# Patient Record
Sex: FEMALE | Race: WHITE | NOT HISPANIC OR LATINO | Employment: FULL TIME | URBAN - METROPOLITAN AREA
[De-identification: names, ages, dates, MRNs, and addresses within clinical notes are randomized per-mention and may not be internally consistent; named-entity substitution may affect disease eponyms.]

---

## 2022-12-16 ENCOUNTER — HOSPITAL ENCOUNTER (EMERGENCY)
Facility: HOSPITAL | Age: 32
Discharge: HOME | End: 2022-12-16
Attending: STUDENT IN AN ORGANIZED HEALTH CARE EDUCATION/TRAINING PROGRAM
Payer: MEDICAID

## 2022-12-16 VITALS
SYSTOLIC BLOOD PRESSURE: 105 MMHG | TEMPERATURE: 98 F | BODY MASS INDEX: 15.36 KG/M2 | HEART RATE: 75 BPM | OXYGEN SATURATION: 98 % | RESPIRATION RATE: 18 BRPM | DIASTOLIC BLOOD PRESSURE: 71 MMHG | WEIGHT: 90 LBS | HEIGHT: 64 IN

## 2022-12-16 DIAGNOSIS — R10.11 RUQ ABDOMINAL PAIN: ICD-10-CM

## 2022-12-16 DIAGNOSIS — K21.9 GASTROESOPHAGEAL REFLUX DISEASE WITHOUT ESOPHAGITIS: Primary | ICD-10-CM

## 2022-12-16 LAB
ALBUMIN SERPL-MCNC: 4.5 G/DL (ref 3.5–5)
ALBUMIN/GLOB SERPL: 1.7 RATIO (ref 1.1–2)
ALP SERPL-CCNC: 63 UNIT/L (ref 40–150)
ALT SERPL-CCNC: 14 UNIT/L (ref 0–55)
APPEARANCE UR: CLEAR
AST SERPL-CCNC: 17 UNIT/L (ref 5–34)
B-HCG SERPL QL: NEGATIVE
BACTERIA #/AREA URNS AUTO: NORMAL /HPF
BASOPHILS # BLD AUTO: 0.02 X10(3)/MCL (ref 0–0.2)
BASOPHILS NFR BLD AUTO: 0.2 %
BILIRUB UR QL STRIP.AUTO: NEGATIVE MG/DL
BILIRUBIN DIRECT+TOT PNL SERPL-MCNC: 0.2 MG/DL (ref 0–0.5)
BILIRUBIN DIRECT+TOT PNL SERPL-MCNC: 0.6 MG/DL
BUN SERPL-MCNC: 7.2 MG/DL (ref 7–18.7)
CALCIUM SERPL-MCNC: 9.8 MG/DL (ref 8.4–10.2)
CHLORIDE SERPL-SCNC: 105 MMOL/L (ref 98–107)
CO2 SERPL-SCNC: 23 MMOL/L (ref 22–29)
COLOR UR AUTO: YELLOW
CREAT SERPL-MCNC: 0.73 MG/DL (ref 0.55–1.02)
EOSINOPHIL # BLD AUTO: 0.03 X10(3)/MCL (ref 0–0.9)
EOSINOPHIL NFR BLD AUTO: 0.3 %
ERYTHROCYTE [DISTWIDTH] IN BLOOD BY AUTOMATED COUNT: 12.9 % (ref 11–14.5)
GFR SERPLBLD CREATININE-BSD FMLA CKD-EPI: >60 MLS/MIN/1.73/M2
GLOBULIN SER-MCNC: 2.7 GM/DL (ref 2.4–3.5)
GLUCOSE SERPL-MCNC: 87 MG/DL (ref 74–100)
GLUCOSE UR QL STRIP.AUTO: NEGATIVE MG/DL
HCT VFR BLD AUTO: 43.3 % (ref 37–47)
HGB BLD-MCNC: 14.9 GM/DL (ref 12–16)
IMM GRANULOCYTES # BLD AUTO: 0.03 X10(3)/MCL (ref 0–0.04)
IMM GRANULOCYTES NFR BLD AUTO: 0.3 %
KETONES UR QL STRIP.AUTO: NEGATIVE MG/DL
LEUKOCYTE ESTERASE UR QL STRIP.AUTO: NEGATIVE UNIT/L
LIPASE SERPL-CCNC: 38 U/L
LYMPHOCYTES # BLD AUTO: 2.65 X10(3)/MCL (ref 0.6–4.6)
LYMPHOCYTES NFR BLD AUTO: 28 %
MCH RBC QN AUTO: 31.8 PG
MCHC RBC AUTO-ENTMCNC: 34.4 MG/DL (ref 33–36)
MCV RBC AUTO: 92.5 FL (ref 80–94)
MONOCYTES # BLD AUTO: 0.48 X10(3)/MCL (ref 0.1–1.3)
MONOCYTES NFR BLD AUTO: 5.1 %
NEUTROPHILS # BLD AUTO: 6.25 X10(3)/MCL (ref 2.1–9.2)
NEUTROPHILS NFR BLD AUTO: 66.1 %
NITRITE UR QL STRIP.AUTO: NEGATIVE
NRBC BLD AUTO-RTO: 0 % (ref 0–1)
PH UR STRIP.AUTO: 7 [PH]
PLATELET # BLD AUTO: 183 X10(3)/MCL (ref 140–371)
PMV BLD AUTO: 10.4 FL (ref 9.4–12.4)
POTASSIUM SERPL-SCNC: 4.1 MMOL/L (ref 3.5–5.1)
PROT SERPL-MCNC: 7.2 GM/DL (ref 6.4–8.3)
PROT UR QL STRIP.AUTO: NEGATIVE MG/DL
RBC # BLD AUTO: 4.68 X10(6)/MCL (ref 4.2–5.4)
RBC #/AREA URNS AUTO: <5 /HPF
RBC UR QL AUTO: NEGATIVE UNIT/L
SODIUM SERPL-SCNC: 136 MMOL/L (ref 136–145)
SP GR UR STRIP.AUTO: 1.01 (ref 1–1.03)
SQUAMOUS #/AREA URNS AUTO: <5 /HPF
UROBILINOGEN UR STRIP-ACNC: 0.2 MG/DL
WBC # SPEC AUTO: 9.5 X10(3)/MCL (ref 4.5–11.5)
WBC #/AREA URNS AUTO: <5 /HPF

## 2022-12-16 PROCEDURE — 82248 BILIRUBIN DIRECT: CPT | Performed by: PHYSICIAN ASSISTANT

## 2022-12-16 PROCEDURE — 83690 ASSAY OF LIPASE: CPT | Performed by: PHYSICIAN ASSISTANT

## 2022-12-16 PROCEDURE — 81001 URINALYSIS AUTO W/SCOPE: CPT | Performed by: PHYSICIAN ASSISTANT

## 2022-12-16 PROCEDURE — 25000003 PHARM REV CODE 250

## 2022-12-16 PROCEDURE — 99285 EMERGENCY DEPT VISIT HI MDM: CPT | Mod: 25

## 2022-12-16 PROCEDURE — 63600175 PHARM REV CODE 636 W HCPCS: Performed by: PHYSICIAN ASSISTANT

## 2022-12-16 PROCEDURE — 81025 URINE PREGNANCY TEST: CPT | Performed by: PHYSICIAN ASSISTANT

## 2022-12-16 PROCEDURE — 96374 THER/PROPH/DIAG INJ IV PUSH: CPT

## 2022-12-16 PROCEDURE — 81003 URINALYSIS AUTO W/O SCOPE: CPT | Performed by: PHYSICIAN ASSISTANT

## 2022-12-16 PROCEDURE — 85025 COMPLETE CBC W/AUTO DIFF WBC: CPT | Performed by: PHYSICIAN ASSISTANT

## 2022-12-16 PROCEDURE — 80053 COMPREHEN METABOLIC PANEL: CPT | Performed by: PHYSICIAN ASSISTANT

## 2022-12-16 RX ORDER — FAMOTIDINE 20 MG/1
20 TABLET, FILM COATED ORAL DAILY
Qty: 30 TABLET | Refills: 0 | Status: ON HOLD | OUTPATIENT
Start: 2022-12-16 | End: 2024-03-19 | Stop reason: HOSPADM

## 2022-12-16 RX ORDER — LIDOCAINE HYDROCHLORIDE 20 MG/ML
15 SOLUTION OROPHARYNGEAL ONCE
Status: COMPLETED | OUTPATIENT
Start: 2022-12-16 | End: 2022-12-16

## 2022-12-16 RX ORDER — MAG HYDROX/ALUMINUM HYD/SIMETH 200-200-20
30 SUSPENSION, ORAL (FINAL DOSE FORM) ORAL ONCE
Status: COMPLETED | OUTPATIENT
Start: 2022-12-16 | End: 2022-12-16

## 2022-12-16 RX ORDER — ONDANSETRON 2 MG/ML
4 INJECTION INTRAMUSCULAR; INTRAVENOUS
Status: COMPLETED | OUTPATIENT
Start: 2022-12-16 | End: 2022-12-16

## 2022-12-16 RX ADMIN — SODIUM CHLORIDE, POTASSIUM CHLORIDE, SODIUM LACTATE AND CALCIUM CHLORIDE 1000 ML: 600; 310; 30; 20 INJECTION, SOLUTION INTRAVENOUS at 04:12

## 2022-12-16 RX ADMIN — LIDOCAINE HYDROCHLORIDE 15 ML: 20 SOLUTION ORAL at 04:12

## 2022-12-16 RX ADMIN — ONDANSETRON 4 MG: 2 INJECTION INTRAMUSCULAR; INTRAVENOUS at 04:12

## 2022-12-16 RX ADMIN — ALUMINUM HYDROXIDE, MAGNESIUM HYDROXIDE, AND SIMETHICONE 30 ML: 200; 200; 20 SUSPENSION ORAL at 04:12

## 2022-12-16 NOTE — ED PROVIDER NOTES
Encounter Date: 12/16/2022       History     Chief Complaint   Patient presents with    Abdominal Pain     Upper abd pain since last Friday, nausea and vomiting      Patient is a 32 year old female who presents to ER with RUQ abdomina pain with intermittent nausea and vomiting x 6 months. Patient reports that the pain worsened over the last week. The pain is worse after she eats and drinks caffeine. She reports that the pain improved after she changed her diet but returned after she ate last week.She denies fever, chills, chest pain, or SOB.     The history is provided by the patient. No  was used.   Abdominal Pain  The current episode started several weeks ago. The onset of the illness was gradual. The problem has not changed since onset.The abdominal pain is located in the RUQ and epigastric region. The abdominal pain does not radiate. The severity of the abdominal pain is 3/10. The abdominal pain is relieved by nothing. The other symptoms of the illness include nausea and vomiting. The other symptoms of the illness do not include fever, shortness of breath, dysuria or hematemesis.   Nausea began more than 1 week ago. The nausea is associated with eating and stress.   The vomiting began more than 2 days ago. Vomiting occurred once.   The patient states that she believes she is currently not pregnant. Symptoms associated with the illness do not include anorexia, heartburn, constipation, urgency, frequency or back pain.   Review of patient's allergies indicates:   Allergen Reactions    Codeine Anaphylaxis    Benadryl [diphenhydramine hcl] Itching     History reviewed. No pertinent past medical history.  No past surgical history on file.  No family history on file.  Social History     Tobacco Use    Smoking status: Every Day     Types: Cigarettes    Smokeless tobacco: Never     Review of Systems   Constitutional:  Negative for fever.   HENT:  Negative for congestion and sore throat.     Respiratory:  Negative for chest tightness and shortness of breath.    Cardiovascular:  Negative for chest pain.   Gastrointestinal:  Positive for abdominal pain, nausea and vomiting. Negative for abdominal distention, anorexia, constipation, heartburn and hematemesis.   Genitourinary:  Negative for dysuria, frequency and urgency.   Musculoskeletal:  Negative for back pain.   Skin:  Negative for rash.   Neurological:  Negative for weakness.   Hematological:  Does not bruise/bleed easily.   Psychiatric/Behavioral:  Negative for behavioral problems.    All other systems reviewed and are negative.    Physical Exam     Initial Vitals [12/16/22 1309]   BP Pulse Resp Temp SpO2   108/67 75 20 98.2 °F (36.8 °C) 99 %      MAP       --         Physical Exam    Nursing note and vitals reviewed.  Constitutional: Vital signs are normal. She appears well-developed and well-nourished.   HENT:   Head: Normocephalic.   Right Ear: Hearing and tympanic membrane normal.   Left Ear: Hearing and tympanic membrane normal.   Nose: Nose normal.   Mouth/Throat: Uvula is midline, oropharynx is clear and moist and mucous membranes are normal.   Cardiovascular:  Regular rhythm, normal heart sounds and normal pulses.           Pulmonary/Chest: Effort normal and breath sounds normal.   Abdominal: Abdomen is soft. Bowel sounds are normal. There is abdominal tenderness in the right upper quadrant and epigastric area.     Lymphadenopathy:     She has no cervical adenopathy.   Neurological: She is alert. GCS eye subscore is 4. GCS verbal subscore is 5. GCS motor subscore is 6.   Skin: Skin is warm, dry and intact. Capillary refill takes less than 2 seconds.       ED Course   Procedures  Labs Reviewed   LIPASE - Normal   URINALYSIS, REFLEX TO URINE CULTURE - Normal   PREGNANCY TEST, URINE RAPID - Normal   BILIRUBIN, DIRECT - Normal   URINALYSIS, MICROSCOPIC - Normal   CBC W/ AUTO DIFFERENTIAL    Narrative:     The following orders were created for  panel order CBC auto differential.  Procedure                               Abnormality         Status                     ---------                               -----------         ------                     CBC with Differential[[id number]]                            Final result                 Please view results for these tests on the individual orders.   COMPREHENSIVE METABOLIC PANEL   CBC WITH DIFFERENTIAL          Imaging Results              US Abdomen Limited (Final result)  Result time 12/16/22 13:47:18      Final result by Timothy Prakash MD (12/16/22 13:47:18)                   Impression:      1. No acute abnormalities are sonographically demonstrated.      Electronically signed by: Timothy Prakash MD  Date:    12/16/2022  Time:    13:47               Narrative:    EXAMINATION:  US ABDOMEN LIMITED    CLINICAL HISTORY:  Right upper quadrant pain.    COMPARISON:  None.    FINDINGS:  Multiple transverse and sagittal grayscale sonographic images were acquired through the right upper quadrant. The liver measures approximately 16.3 cm in craniocaudal dimension.  The main portal vein demonstrates antegrade flow. The common bile duct measures approximately 1.9 mm in visualized diameter. No evidence of cholelithiasis, gallbladder wall thickening, or pericholecystic fluid is demonstrated. No sonographic Le sign was present per the ultrasound technologist report which should be correlated for any potential analgesic administration. The pancreas is obscured by bowel gas artifact. The limitedly visualized and assessed portions of the pancreas appear to be grossly within normal limits. The right kidney measures approximately 9.6 cm in length. No evidence of hydronephrosis or definite echogenic and shadowing renal calculi is demonstrated. The proximal visualized IVC appears to be patent.  The visualized abdominal aorta appears to be grossly normal in caliber.                                       Medications    lactated ringers bolus 1,000 mL (0 mLs Intravenous Stopped 12/16/22 7545)   ondansetron injection 4 mg (4 mg Intravenous Given 12/16/22 1624)   aluminum-magnesium hydroxide-simethicone 200-200-20 mg/5 mL suspension 30 mL (30 mLs Oral Given 12/16/22 1649)     And   LIDOcaine HCl 2% oral solution 15 mL (15 mLs Oral Given 12/16/22 1649)     Medical Decision Making:   Initial Assessment:   Awake and alert, NAD.  Differential Diagnosis:   Gastroenteritis, GERD, gastritis, cholelithiasis, cholecystitis  Clinical Tests:   Lab Tests: Reviewed and Ordered  Radiological Study: Ordered and Reviewed  ED Management:  Patient is a 32 year old female who presents to ER with epigastric and RUQ pain after eating x 6 months. She appears very comfortable. On exam, she does have some RUQ tenderness. She reports her symptoms initially improved after she changed her diet. Her symptoms seem consistent with GERD. Her pain improved after zofran and Gi cocktail. She is able to tolerate Po without issue. Her labs are unremarkable and US abdomen shows no acute abnormality. She is ready to be discharged. Will dc on pepcid and carafate. Encouraged patient to follow up with PCP. Given strict er return precautions.                         Clinical Impression:   Final diagnoses:  [K21.9] Gastroesophageal reflux disease without esophagitis (Primary)  [R10.11] RUQ abdominal pain        ED Disposition Condition    Discharge Stable          ED Prescriptions       Medication Sig Dispense Start Date End Date Auth. Provider    famotidine (PEPCID) 20 MG tablet Take 1 tablet (20 mg total) by mouth once daily. 30 tablet 12/16/2022 1/15/2023 Merline Travis NP          Follow-up Information       Follow up With Specialties Details Why Contact Info    Primary Care Provider  Schedule an appointment as soon as possible for a visit  As needed, If symptoms worsen              Merline Travis NP  12/16/22 8130

## 2022-12-16 NOTE — FIRST PROVIDER EVALUATION
"Medical screening examination initiated.  I have conducted a focused provider triage encounter, findings are as follows:    Brief history of present illness:  33 yo female presents to ED for evaluation of RUQ abdominal, nausea and vomiting for the past week. Complains of chills.     Vitals:    12/16/22 1309   BP: 108/67   Patient Position: Sitting   Pulse: 75   Resp: 20   Temp: 98.2 °F (36.8 °C)   TempSrc: Oral   SpO2: 99%   Weight: 40.8 kg (90 lb)   Height: 5' 4" (1.626 m)       Pertinent physical exam:  Patient is awake and alert and oriented.  Ambulatory to triage.  In no acute distress.      Brief workup plan:  Labs, UA, UPT, US    Preliminary workup initiated; this workup will be continued and followed by the physician or advanced practice provider that is assigned to the patient when roomed.  "

## 2024-03-12 ENCOUNTER — HOSPITAL ENCOUNTER (EMERGENCY)
Facility: HOSPITAL | Age: 34
Discharge: HOME | End: 2024-03-12
Attending: FAMILY MEDICINE
Payer: MEDICAID

## 2024-03-12 VITALS
DIASTOLIC BLOOD PRESSURE: 74 MMHG | WEIGHT: 99.19 LBS | SYSTOLIC BLOOD PRESSURE: 112 MMHG | HEIGHT: 64 IN | BODY MASS INDEX: 16.93 KG/M2 | TEMPERATURE: 98 F | OXYGEN SATURATION: 96 % | RESPIRATION RATE: 18 BRPM | HEART RATE: 124 BPM

## 2024-03-12 DIAGNOSIS — Z13.9 ENCOUNTER FOR MEDICAL SCREENING EXAMINATION: Primary | ICD-10-CM

## 2024-03-12 PROCEDURE — 99281 EMR DPT VST MAYX REQ PHY/QHP: CPT

## 2024-03-12 NOTE — Clinical Note
"Felicita Geronimoelle" Esme was seen and treated in our emergency department on 3/12/2024.  She may return to work on 03/13/2024.       If you have any questions or concerns, please don't hesitate to call.      Dr. Fiore/Seema Danielson RN    "

## 2024-03-12 NOTE — ED NOTES
"Pt denies SI or HI, denies need for PEC, denies fighting with mother at home, exhibits flat affect and slow timing with responses, Dr. Fiore confirmed PEC is not needed since no SI or HI, pt reports "I have someone to come pick me up," pt waiting comfortably for ride.   "

## 2024-03-12 NOTE — ED PROVIDER NOTES
Encounter Date: 3/12/2024       History     Chief Complaint   Patient presents with    Psychiatric Evaluation     OPC'd by mother for Bipolar, anxiety, noncompliance with medications. Denies depression or suicidal/homicidal ideations. Reports she takes her medications sometimes     33-year-old presents on an OPC says she has a history of bipolar disorder and uses marijuana mom says she has not been cleaning herself she is depressed and she is concerned she suicidal patient denies any homicidal or suicidal ideations patient says she is depressed but not to the point of wanting to hurt herself or hurting anybody else patient states she does have a psychiatrist that she sees but not consistently and has not been consistent with her medications after having long discussion with the patient at this time I do not patient feel patient is a danger to herself she has no suicidal ideations no homicidal ideations no desire to hurt herself says that she will get an appointment with her psychiatrist states she does cleaned herself and she does eat she just has a disagreement with her mom nurses present in the room during this interview patient is calm and quiet answers questions appropriately shows no signs of antwon or psychotic episodes I do not feel it this point would be appropriate to come in her against her will patient knows if she desires help she can come back and we will be more than happy to get her placed in his psychiatric hospital she said she would just like to get back on her meds how that does        Review of patient's allergies indicates:   Allergen Reactions    Codeine Anaphylaxis and Rash    Hydrocodone-acetaminophen Other (See Comments) and Swelling     Facial and mouth swelling    Diphenhydramine-pseudoephed     Diphenhydramine hcl Itching and Rash     Past Medical History:   Diagnosis Date    Anxiety disorder, unspecified     Bipolar disorder, unspecified      History reviewed. No pertinent surgical  history.  History reviewed. No pertinent family history.  Social History     Tobacco Use    Smoking status: Every Day     Types: Cigarettes    Smokeless tobacco: Never   Substance Use Topics    Drug use: Not Currently     Review of Systems   Constitutional:  Negative for fever.   HENT:  Negative for sore throat.    Respiratory:  Negative for shortness of breath.    Cardiovascular:  Negative for chest pain.   Gastrointestinal:  Negative for nausea.   Genitourinary:  Negative for dysuria.   Musculoskeletal:  Negative for back pain.   Skin:  Negative for rash.   Neurological:  Negative for weakness.   Hematological:  Does not bruise/bleed easily.   Psychiatric/Behavioral:  Negative for agitation, behavioral problems, confusion, hallucinations, self-injury, sleep disturbance and suicidal ideas. The patient is not nervous/anxious.    All other systems reviewed and are negative.      Physical Exam     Initial Vitals [03/12/24 1131]   BP Pulse Resp Temp SpO2   112/74 (!) 124 18 98 °F (36.7 °C) 96 %      MAP       --         Physical Exam    Nursing note and vitals reviewed.  Constitutional: She appears well-developed and well-nourished. She is active.   HENT:   Head: Normocephalic and atraumatic.   Eyes: Conjunctivae, EOM and lids are normal. Pupils are equal, round, and reactive to light.   Neck: Trachea normal and phonation normal. Neck supple. No thyroid mass present.   Normal range of motion.  Cardiovascular:  Normal rate, regular rhythm, normal heart sounds and normal pulses.           Pulmonary/Chest: Breath sounds normal.   Abdominal: Abdomen is soft. Bowel sounds are normal.   Musculoskeletal:         General: Normal range of motion.      Cervical back: Normal range of motion and neck supple.     Neurological: She is alert and oriented to person, place, and time. She has normal strength and normal reflexes.   Skin: Skin is warm and intact.   Psychiatric: She has a normal mood and affect. Her speech is normal and  behavior is normal. Judgment and thought content normal. Cognition and memory are normal.         ED Course   Procedures  Labs Reviewed - No data to display       Imaging Results    None          Medications - No data to display  Medical Decision Making  33-year-old presents on an OPC says she has a history of bipolar disorder and uses marijuana mom says she has not been cleaning herself she is depressed and she is concerned she suicidal patient denies any homicidal or suicidal ideations patient says she is depressed but not to the point of wanting to hurt herself or hurting anybody else patient states she does have a psychiatrist that she sees but not consistently and has not been consistent with her medications after having long discussion with the patient at this time I do not patient feel patient is a danger to herself she has no suicidal ideations no homicidal ideations no desire to hurt herself says that she will get an appointment with her psychiatrist states she does cleaned herself and she does eat she just has a disagreement with her mom nurses present in the room during this interview patient is calm and quiet answers questions appropriately shows no signs of antwon or psychotic episodes I do not feel it this point would be appropriate to come in her against her will patient knows if she desires help she can come back and we will be more than happy to get her placed in his psychiatric hospital she said she would just like to get back on her meds how that does          Risk  Risk Details: Differential diagnosis situational depression, dysthymia, bipolar disorder                                      Clinical Impression:  Final diagnoses:  [Z13.9] Encounter for medical screening examination (Primary)          ED Disposition Condition    Discharge Stable          ED Prescriptions    None       Follow-up Information       Follow up With Specialties Details Why Contact Info    [facility] - Emergency Dept  Emergency Medicine  As needed [address] [place] [id number]  [phone]             Mendel Fiore MD  03/12/24 1148       Mendel Fiore MD  03/12/24 1148

## 2024-03-12 NOTE — DISCHARGE INSTRUCTIONS
Please return to emergency room if you have any thoughts of harming herself or harming others order desire inpatient treatment for depression recommend he follow up with the psychiatrist and your family doctor return to ER as needed

## 2024-03-13 ENCOUNTER — HOSPITAL ENCOUNTER (EMERGENCY)
Facility: HOSPITAL | Age: 34
Discharge: PSYCHIATRIC OR SUBSTANCE USE FACILITY | End: 2024-03-13
Attending: FAMILY MEDICINE
Payer: MEDICAID

## 2024-03-13 ENCOUNTER — HOSPITAL ENCOUNTER (INPATIENT)
Facility: HOSPITAL | Age: 34
LOS: 7 days | Discharge: HOME | DRG: 885 | End: 2024-03-20
Attending: PSYCHIATRY & NEUROLOGY | Admitting: PSYCHIATRY & NEUROLOGY
Payer: MEDICAID

## 2024-03-13 VITALS
HEIGHT: 64 IN | OXYGEN SATURATION: 98 % | DIASTOLIC BLOOD PRESSURE: 85 MMHG | HEART RATE: 75 BPM | SYSTOLIC BLOOD PRESSURE: 119 MMHG | RESPIRATION RATE: 18 BRPM | BODY MASS INDEX: 18.78 KG/M2 | TEMPERATURE: 98 F | WEIGHT: 110 LBS

## 2024-03-13 DIAGNOSIS — F29 PSYCHOSIS: ICD-10-CM

## 2024-03-13 DIAGNOSIS — Z00.8 MEDICAL CLEARANCE FOR PSYCHIATRIC ADMISSION: Primary | ICD-10-CM

## 2024-03-13 LAB
ALBUMIN SERPL-MCNC: 4.7 G/DL (ref 3.5–5)
ALBUMIN/GLOB SERPL: 1.4 RATIO (ref 1.1–2)
ALP SERPL-CCNC: 65 UNIT/L (ref 40–150)
ALT SERPL-CCNC: 20 UNIT/L (ref 0–55)
AMPHET UR QL SCN: NEGATIVE
APAP SERPL-MCNC: <17.4 UG/ML (ref 17.4–30)
APPEARANCE UR: ABNORMAL
AST SERPL-CCNC: 26 UNIT/L (ref 5–34)
B-HCG SERPL QL: NEGATIVE
BACTERIA #/AREA URNS AUTO: ABNORMAL /HPF
BARBITURATE SCN PRESENT UR: NEGATIVE
BASOPHILS # BLD AUTO: 0.03 X10(3)/MCL
BASOPHILS NFR BLD AUTO: 0.3 %
BENZODIAZ UR QL SCN: NEGATIVE
BILIRUB SERPL-MCNC: 1 MG/DL
BILIRUB UR QL STRIP.AUTO: ABNORMAL
BUN SERPL-MCNC: 12.9 MG/DL (ref 7–18.7)
CALCIUM SERPL-MCNC: 10.1 MG/DL (ref 8.4–10.2)
CANNABINOIDS UR QL SCN: POSITIVE
CHLORIDE SERPL-SCNC: 102 MMOL/L (ref 98–107)
CO2 SERPL-SCNC: 20 MMOL/L (ref 22–29)
COCAINE UR QL SCN: NEGATIVE
COLOR UR AUTO: YELLOW
CREAT SERPL-MCNC: 0.76 MG/DL (ref 0.55–1.02)
EOSINOPHIL # BLD AUTO: 0.01 X10(3)/MCL (ref 0–0.9)
EOSINOPHIL NFR BLD AUTO: 0.1 %
ERYTHROCYTE [DISTWIDTH] IN BLOOD BY AUTOMATED COUNT: 12.4 % (ref 11.5–17)
ETHANOL SERPL-MCNC: <10 MG/DL
FLUAV AG UPPER RESP QL IA.RAPID: NOT DETECTED
FLUBV AG UPPER RESP QL IA.RAPID: NOT DETECTED
GFR SERPLBLD CREATININE-BSD FMLA CKD-EPI: >60 MLS/MIN/1.73/M2
GLOBULIN SER-MCNC: 3.3 GM/DL (ref 2.4–3.5)
GLUCOSE SERPL-MCNC: 91 MG/DL (ref 74–100)
GLUCOSE UR QL STRIP.AUTO: NEGATIVE
HCT VFR BLD AUTO: 42.1 % (ref 37–47)
HGB BLD-MCNC: 14.7 G/DL (ref 12–16)
IMM GRANULOCYTES # BLD AUTO: 0 X10(3)/MCL (ref 0–0.04)
IMM GRANULOCYTES NFR BLD AUTO: 0 %
KETONES UR QL STRIP.AUTO: 80
LEUKOCYTE ESTERASE UR QL STRIP.AUTO: NEGATIVE
LYMPHOCYTES # BLD AUTO: 2.19 X10(3)/MCL (ref 0.6–4.6)
LYMPHOCYTES NFR BLD AUTO: 22.8 %
MCH RBC QN AUTO: 31.7 PG (ref 27–31)
MCHC RBC AUTO-ENTMCNC: 34.9 G/DL (ref 33–36)
MCV RBC AUTO: 90.9 FL (ref 80–94)
MONOCYTES # BLD AUTO: 0.52 X10(3)/MCL (ref 0.1–1.3)
MONOCYTES NFR BLD AUTO: 5.4 %
NEUTROPHILS # BLD AUTO: 6.84 X10(3)/MCL (ref 2.1–9.2)
NEUTROPHILS NFR BLD AUTO: 71.4 %
NITRITE UR QL STRIP.AUTO: NEGATIVE
OPIATES UR QL SCN: NEGATIVE
PCP UR QL: NEGATIVE
PH UR STRIP.AUTO: 5.5 [PH]
PH UR: 5.5 [PH] (ref 3–11)
PLATELET # BLD AUTO: 193 X10(3)/MCL (ref 130–400)
PMV BLD AUTO: 10.7 FL (ref 7.4–10.4)
POTASSIUM SERPL-SCNC: 3.8 MMOL/L (ref 3.5–5.1)
PROT SERPL-MCNC: 8 GM/DL (ref 6.4–8.3)
PROT UR QL STRIP.AUTO: 100
RBC # BLD AUTO: 4.63 X10(6)/MCL (ref 4.2–5.4)
RBC #/AREA URNS AUTO: ABNORMAL /HPF
RBC UR QL AUTO: ABNORMAL
SALICYLATES SERPL-MCNC: <5 MG/DL (ref 15–30)
SARS-COV-2 RNA RESP QL NAA+PROBE: NOT DETECTED
SODIUM SERPL-SCNC: 139 MMOL/L (ref 136–145)
SP GR UR STRIP.AUTO: >=1.03 (ref 1–1.03)
SPECIFIC GRAVITY, URINE AUTO (.000) (OHS): >=1.03 (ref 1–1.03)
SQUAMOUS #/AREA URNS AUTO: ABNORMAL /HPF
UROBILINOGEN UR STRIP-ACNC: 0.2
WBC # SPEC AUTO: 9.59 X10(3)/MCL (ref 4.5–11.5)
WBC #/AREA URNS AUTO: ABNORMAL /HPF

## 2024-03-13 PROCEDURE — 96372 THER/PROPH/DIAG INJ SC/IM: CPT | Performed by: FAMILY MEDICINE

## 2024-03-13 PROCEDURE — 11400000 HC PSYCH PRIVATE ROOM

## 2024-03-13 PROCEDURE — 80143 DRUG ASSAY ACETAMINOPHEN: CPT | Performed by: FAMILY MEDICINE

## 2024-03-13 PROCEDURE — 80307 DRUG TEST PRSMV CHEM ANLYZR: CPT | Performed by: FAMILY MEDICINE

## 2024-03-13 PROCEDURE — 81003 URINALYSIS AUTO W/O SCOPE: CPT | Performed by: FAMILY MEDICINE

## 2024-03-13 PROCEDURE — 0240U COVID/FLU A&B PCR: CPT | Performed by: FAMILY MEDICINE

## 2024-03-13 PROCEDURE — 81025 URINE PREGNANCY TEST: CPT | Performed by: FAMILY MEDICINE

## 2024-03-13 PROCEDURE — 85025 COMPLETE CBC W/AUTO DIFF WBC: CPT | Performed by: FAMILY MEDICINE

## 2024-03-13 PROCEDURE — 80179 DRUG ASSAY SALICYLATE: CPT | Performed by: FAMILY MEDICINE

## 2024-03-13 PROCEDURE — 99285 EMERGENCY DEPT VISIT HI MDM: CPT | Mod: 25

## 2024-03-13 PROCEDURE — 82077 ASSAY SPEC XCP UR&BREATH IA: CPT | Performed by: FAMILY MEDICINE

## 2024-03-13 PROCEDURE — 80053 COMPREHEN METABOLIC PANEL: CPT | Performed by: FAMILY MEDICINE

## 2024-03-13 PROCEDURE — 63600175 PHARM REV CODE 636 W HCPCS: Performed by: FAMILY MEDICINE

## 2024-03-13 RX ORDER — IBUPROFEN 400 MG/1
400 TABLET ORAL EVERY 6 HOURS PRN
Status: DISCONTINUED | OUTPATIENT
Start: 2024-03-13 | End: 2024-03-20 | Stop reason: HOSPADM

## 2024-03-13 RX ORDER — HYDROXYZINE PAMOATE 25 MG/1
50 CAPSULE ORAL EVERY 6 HOURS PRN
Status: DISCONTINUED | OUTPATIENT
Start: 2024-03-13 | End: 2024-03-20 | Stop reason: HOSPADM

## 2024-03-13 RX ORDER — ALUMINUM HYDROXIDE, MAGNESIUM HYDROXIDE, AND SIMETHICONE 1200; 120; 1200 MG/30ML; MG/30ML; MG/30ML
30 SUSPENSION ORAL EVERY 6 HOURS PRN
Status: DISCONTINUED | OUTPATIENT
Start: 2024-03-13 | End: 2024-03-20 | Stop reason: HOSPADM

## 2024-03-13 RX ORDER — IBUPROFEN 200 MG
1 TABLET ORAL DAILY
Status: DISCONTINUED | OUTPATIENT
Start: 2024-03-14 | End: 2024-03-20 | Stop reason: HOSPADM

## 2024-03-13 RX ORDER — LORAZEPAM 1 MG/1
1 TABLET ORAL 3 TIMES DAILY
Status: DISCONTINUED | OUTPATIENT
Start: 2024-03-13 | End: 2024-03-16

## 2024-03-13 RX ORDER — LORAZEPAM 2 MG/ML
2 INJECTION INTRAMUSCULAR
Status: COMPLETED | OUTPATIENT
Start: 2024-03-13 | End: 2024-03-13

## 2024-03-13 RX ORDER — BUSPIRONE HYDROCHLORIDE 15 MG/1
15 TABLET ORAL 2 TIMES DAILY
Status: ON HOLD | COMMUNITY
End: 2024-03-19 | Stop reason: HOSPADM

## 2024-03-13 RX ORDER — OLANZAPINE 5 MG/1
5 TABLET, ORALLY DISINTEGRATING ORAL NIGHTLY
Status: DISCONTINUED | OUTPATIENT
Start: 2024-03-14 | End: 2024-03-16

## 2024-03-13 RX ORDER — ZIPRASIDONE MESYLATE 20 MG/ML
10 INJECTION, POWDER, LYOPHILIZED, FOR SOLUTION INTRAMUSCULAR
Status: COMPLETED | OUTPATIENT
Start: 2024-03-13 | End: 2024-03-13

## 2024-03-13 RX ORDER — OLANZAPINE 10 MG/1
10 TABLET, ORALLY DISINTEGRATING ORAL EVERY 8 HOURS PRN
Status: DISCONTINUED | OUTPATIENT
Start: 2024-03-13 | End: 2024-03-20 | Stop reason: HOSPADM

## 2024-03-13 RX ADMIN — LORAZEPAM 2 MG: 2 INJECTION INTRAMUSCULAR; INTRAVENOUS at 01:03

## 2024-03-13 RX ADMIN — ZIPRASIDONE MESYLATE 10 MG: 20 INJECTION, POWDER, LYOPHILIZED, FOR SOLUTION INTRAMUSCULAR at 01:03

## 2024-03-13 NOTE — ED NOTES
"RENETTA called; Michaela stated pt can not be accepted for transport due to medicaid not covering transport, JUAN called, [employer] will send ETA "soon."  "

## 2024-03-13 NOTE — ED PROVIDER NOTES
Encounter Date: 3/13/2024       History     Chief Complaint   Patient presents with    Psychiatric Evaluation     Was seen on OPC yesterday and discharged.  She is brought in by PD after being found laying in the middle of the sidewalk multiple times. One time her legs were underneath the vehicle.  She appears to be hallucinating.      Acute psychosis   33-year-old female patient brought in by the police department for hallucinations and delusions patient has history of bipolar with manic episodes otherwise patient has extensive psychiatric illness but does not take any medicines for this patient has a history source with the police department        Review of patient's allergies indicates:   Allergen Reactions    Codeine Anaphylaxis and Rash    Hydrocodone-acetaminophen Other (See Comments) and Swelling     Facial and mouth swelling    Diphenhydramine-pseudoephed     Diphenhydramine hcl Itching and Rash     Past Medical History:   Diagnosis Date    Anxiety disorder, unspecified     Bipolar disorder, unspecified      No past surgical history on file.  No family history on file.  Social History     Tobacco Use    Smoking status: Every Day     Types: Cigarettes    Smokeless tobacco: Never   Substance Use Topics    Drug use: Not Currently     Review of Systems   Constitutional:  Negative for fever.   HENT:  Negative for sore throat.    Respiratory:  Negative for shortness of breath.    Cardiovascular:  Negative for chest pain.   Gastrointestinal:  Negative for nausea.   Genitourinary:  Negative for dysuria.   Musculoskeletal:  Negative for back pain.   Skin:  Negative for rash.   Neurological:  Negative for weakness.   Hematological:  Does not bruise/bleed easily.   Psychiatric/Behavioral:  Positive for agitation, behavioral problems, dysphoric mood and hallucinations.    All other systems reviewed and are negative.      Physical Exam     Initial Vitals [03/13/24 1301]   BP Pulse Resp Temp SpO2   120/64 97 18 98.3 °F  (36.8 °C) 97 %      MAP       --         Physical Exam    Nursing note and vitals reviewed.  Constitutional: She appears well-developed.   HENT:   Head: Normocephalic and atraumatic.   Right Ear: External ear normal.   Left Ear: External ear normal.   Nose: Nose normal.   Mouth/Throat: Oropharynx is clear and moist. No oropharyngeal exudate.   Eyes: Conjunctivae and EOM are normal. Pupils are equal, round, and reactive to light. Right eye exhibits no discharge. Left eye exhibits no discharge.   Neck: Neck supple. No tracheal deviation present. No JVD present.   Normal range of motion.  Cardiovascular:  Normal rate, regular rhythm, normal heart sounds and intact distal pulses.     Exam reveals no gallop and no friction rub.       No murmur heard.  Pulmonary/Chest: Breath sounds normal. No stridor. No respiratory distress. She has no wheezes. She has no rhonchi. She has no rales.   Abdominal: Abdomen is soft. Bowel sounds are normal. She exhibits no distension and no mass. There is no abdominal tenderness. There is no rebound and no guarding.   Musculoskeletal:         General: Normal range of motion.      Cervical back: Normal range of motion and neck supple.     Neurological: She is alert and oriented to person, place, and time. She has normal strength. No cranial nerve deficit.   Skin: Skin is warm and dry. No rash and no abscess noted. No erythema.   Psychiatric:   Depressed with hallucinations delusions and mild hallucinations         ED Course   Procedures  Labs Reviewed   COMPREHENSIVE METABOLIC PANEL - Abnormal; Notable for the following components:       Result Value    Carbon Dioxide 20 (*)     All other components within normal limits   URINALYSIS, REFLEX TO URINE CULTURE - Abnormal; Notable for the following components:    Appearance, UA Slightly Cloudy (*)     Protein,  (*)     Ketones, UA 80 (*)     Blood, UA Large (*)     Bilirubin, UA Small (*)     All other components within normal limits   DRUG  SCREEN, URINE (BEAKER) - Abnormal; Notable for the following components:    Cannabinoids, Urine Positive (*)     All other components within normal limits    Narrative:     Cut off concentrations:    Amphetamines - 1000 ng/ml  Barbiturates - 200 ng/ml  Benzodiazepine - 200 ng/ml  Cannabinoids (THC) - 50 ng/ml  Cocaine - 300 ng/ml  Fentanyl - 1.0 ng/ml  MDMA - 500 ng/ml  Opiates - 300 ng/ml   Phencyclidine (PCP) - 25 ng/ml    Specimen submitted for drug analysis and tested for pH and specific gravity in order to evaluate sample integrity. Suspect tampering if specific gravity is <1.003 and/or pH is not within the range of 4.5 - 8.0  False negatives may result form substances such as bleach added to urine.  False positives may result for the presence of a substance with similar chemical structure to the drug or its metabolite.    This test provides only a PRELIMINARY analytical test result. A more specific alternate chemical method must be used in order to obtain a confirmed analytical result. Gas chromatography/mass spectrometry (GC/MS) is the preferred confirmatory method. Other chemical confirmation methods are available. Clinical consideration and professional judgement should be applied to any drug of abuse test result, particularly when preliminary positive results are used.    Positive results will be confirmed only at the physicians request. Unconfirmed screening results are to be used only for medical purposes (treatment).        ACETAMINOPHEN LEVEL - Abnormal; Notable for the following components:    Acetaminophen Level <17.4 (*)     All other components within normal limits   SALICYLATE LEVEL - Abnormal; Notable for the following components:    Salicylate Level <5.0 (*)     All other components within normal limits   CBC WITH DIFFERENTIAL - Abnormal; Notable for the following components:    MCH 31.7 (*)     MPV 10.7 (*)     All other components within normal limits   ALCOHOL,MEDICAL (ETHANOL) - Normal    COVID/FLU A&B PCR - Normal    Narrative:     The Xpert Xpress SARS-CoV-2/FLU/RSV plus is a rapid, multiplexed real-time PCR test intended for the simultaneous qualitative detection and differentiation of SARS-CoV-2, Influenza A, Influenza B, and respiratory syncytial virus (RSV) viral RNA in either nasopharyngeal swab or nasal swab specimens.         PREGNANCY TEST, URINE RAPID - Normal   CBC W/ AUTO DIFFERENTIAL    Narrative:     The following orders were created for panel order CBC auto differential.  Procedure                               Abnormality         Status                     ---------                               -----------         ------                     CBC with Differential[[id number]]       Abnormal            Final result                 Please view results for these tests on the individual orders.   URINALYSIS, MICROSCOPIC          Imaging Results    None          Medications   LORazepam injection 2 mg (2 mg Intramuscular Given 3/13/24 1350)   ziprasidone injection 10 mg (10 mg Intramuscular Given 3/13/24 1349)     Medical Decision Making  Acute psychosis bipolar extensive psychiatric history noncompliant    Amount and/or Complexity of Data Reviewed  Labs: ordered.    Risk  Prescription drug management.                  Medically cleared for psychiatry placement: 3/13/2024  3:23 PM                   Clinical Impression:  Final diagnoses:  [Z00.8] Medical clearance for psychiatric admission (Primary)          ED Disposition Condition    Transfer to Psych Facility Stable          ED Prescriptions    None       Follow-up Information    None          Jaden Lea MD  03/13/24 0184

## 2024-03-13 NOTE — ED NOTES
Ceci beckwith called, spoke with Fany, acceptance to [facility], Dr. Kerns is the accepted physician, Fany stated to call [phone] in 10 minutes.

## 2024-03-14 PROBLEM — F31.63 BIPOLAR 1 DISORDER, MIXED, SEVERE: Status: ACTIVE | Noted: 2024-03-14

## 2024-03-14 PROBLEM — F23 ACUTE PSYCHOSIS: Status: RESOLVED | Noted: 2024-03-14 | Resolved: 2024-03-14

## 2024-03-14 PROBLEM — Z72.0 TOBACCO USE: Status: ACTIVE | Noted: 2024-03-14

## 2024-03-14 PROBLEM — Z91.148 NONADHERENCE TO MEDICATION: Status: ACTIVE | Noted: 2024-03-14

## 2024-03-14 PROBLEM — F23 ACUTE PSYCHOSIS: Status: ACTIVE | Noted: 2024-03-14

## 2024-03-14 LAB
CHOLEST SERPL-MCNC: 140 MG/DL
CHOLEST/HDLC SERPL: 3 {RATIO} (ref 0–5)
GLUCOSE P FAST SERPL-MCNC: 80 MG/DL (ref 70–100)
HDLC SERPL-MCNC: 48 MG/DL (ref 35–60)
LDLC SERPL CALC-MCNC: 81 MG/DL (ref 50–140)
T PALLIDUM AB SER QL: NONREACTIVE
TRIGL SERPL-MCNC: 54 MG/DL (ref 37–140)
VLDLC SERPL CALC-MCNC: 11 MG/DL

## 2024-03-14 PROCEDURE — 82947 ASSAY GLUCOSE BLOOD QUANT: CPT | Performed by: PSYCHIATRY & NEUROLOGY

## 2024-03-14 PROCEDURE — 25000003 PHARM REV CODE 250: Performed by: PSYCHIATRY & NEUROLOGY

## 2024-03-14 PROCEDURE — 86780 TREPONEMA PALLIDUM: CPT | Performed by: PSYCHIATRY & NEUROLOGY

## 2024-03-14 PROCEDURE — S4991 NICOTINE PATCH NONLEGEND: HCPCS | Performed by: PSYCHIATRY & NEUROLOGY

## 2024-03-14 PROCEDURE — 80061 LIPID PANEL: CPT | Performed by: PSYCHIATRY & NEUROLOGY

## 2024-03-14 PROCEDURE — 11400000 HC PSYCH PRIVATE ROOM

## 2024-03-14 RX ORDER — OXCARBAZEPINE 150 MG/1
150 TABLET, FILM COATED ORAL 2 TIMES DAILY
Status: DISCONTINUED | OUTPATIENT
Start: 2024-03-14 | End: 2024-03-16

## 2024-03-14 RX ADMIN — LORAZEPAM 1 MG: 1 TABLET ORAL at 08:03

## 2024-03-14 RX ADMIN — OLANZAPINE 5 MG: 5 TABLET, ORALLY DISINTEGRATING ORAL at 08:03

## 2024-03-14 RX ADMIN — NICOTINE 1 PATCH: 14 PATCH, EXTENDED RELEASE TRANSDERMAL at 08:03

## 2024-03-14 RX ADMIN — OXCARBAZEPINE 150 MG: 150 TABLET, FILM COATED ORAL at 08:03

## 2024-03-14 RX ADMIN — LORAZEPAM 1 MG: 1 TABLET ORAL at 03:03

## 2024-03-14 RX ADMIN — OLANZAPINE 10 MG: 10 TABLET, ORALLY DISINTEGRATING ORAL at 08:03

## 2024-03-14 NOTE — CONSULTS
[facility] Hospital Medicine  Consult Note    Patient Name: Felicita Victoria  MRN: [id number]  Admission Date: 3/13/2024  Hospital Length of Stay: 1 days  Attending Physician: Angel Kerns MD   Primary Care Provider: Cherrie, Primary Doctor   Consults: Ailyn Montemayor DO - Hospitalist          Patient information was obtained from ER records.     Consults  Subjective:     Principal Problem: <principal problem not specified>  Acute psychosis  Chief Complaint: No chief complaint on file.   PEC    HPI: 32 yo CF with PMH of manic bipolar was brought to [place] ED by PD for bizarre behavior in public. UDS + for cannabinoids. Pt PEC'd and transferred to [facility] for further treatment. Pt denies CP, SOB, trouble tasting or smelling. She was cooperative for examination.    Past Medical History:   Diagnosis Date    Anxiety disorder, unspecified     Bipolar disorder, unspecified        No past surgical history on file.    Review of patient's allergies indicates:   Allergen Reactions    Codeine Anaphylaxis and Rash    Hydrocodone-acetaminophen Other (See Comments) and Swelling     Facial and mouth swelling    Diphenhydramine-pseudoephed     Diphenhydramine hcl Itching and Rash       Current Facility-Administered Medications on File Prior to Encounter   Medication    [COMPLETED] LORazepam injection 2 mg    [COMPLETED] ziprasidone injection 10 mg     Current Outpatient Medications on File Prior to Encounter   Medication Sig    busPIRone (BUSPAR) 15 MG tablet Take 15 mg by mouth 2 (two) times daily.    famotidine (PEPCID) 20 MG tablet Take 1 tablet (20 mg total) by mouth once daily.     Family History    None       Tobacco Use    Smoking status: Every Day     Types: Cigarettes    Smokeless tobacco: Never   Substance and Sexual Activity    Alcohol use: Not on file    Drug use: Not Currently    Sexual activity: Not on file     Review of Systems   Constitutional:  Negative for fever.   HENT:  Negative.     Eyes: Negative.    Respiratory: Negative.  Negative for shortness of breath.    Cardiovascular:  Negative for chest pain.   Gastrointestinal:  Negative for abdominal distention, abdominal pain, nausea and vomiting.   Musculoskeletal:  Negative for back pain.   Neurological: Negative.    Psychiatric/Behavioral:  Positive for behavioral problems and dysphoric mood.      Objective:     Vital Signs (Most Recent):  Temp: 98 °F (36.7 °C) (03/14/24 0613)  Pulse: 110 (03/14/24 0613)  Resp: 18 (03/14/24 0613)  BP: 119/82 (03/14/24 0613)  SpO2: 96 % (03/14/24 0613) Vital Signs (24h Range):  Temp:  [97.8 °F (36.6 °C)-98 °F (36.7 °C)] 98 °F (36.7 °C)  Pulse:  [] 110  Resp:  [18] 18  SpO2:  [96 %-98 %] 96 %  BP: (113-119)/(73-85) 119/82     Weight: 40.2 kg (88 lb 9.6 oz)  Body mass index is 15.21 kg/m².     Physical Exam  Vitals and nursing note reviewed. Exam conducted with a chaperone present.   Constitutional:       General: She is not in acute distress.  HENT:      Head: Normocephalic and atraumatic.      Nose: Nose normal.      Mouth/Throat:      Mouth: Mucous membranes are moist.   Eyes:      Extraocular Movements: Extraocular movements intact and EOM normal.      Conjunctiva/sclera: Conjunctivae normal.      Pupils: Pupils are equal, round, and reactive to light.   Cardiovascular:      Rate and Rhythm: Normal rate and regular rhythm.      Heart sounds: Normal heart sounds. No murmur heard.     No gallop.   Pulmonary:      Effort: Pulmonary effort is normal.      Breath sounds: Normal breath sounds. No wheezing, rhonchi or rales.   Musculoskeletal:         General: No swelling or deformity. Normal range of motion.      Cervical back: Normal range of motion and neck supple.   Skin:     General: Skin is warm and dry.   Neurological:      General: No focal deficit present.      Mental Status: She is alert.      Gait: Gait normal.   Psychiatric:         Mood and Affect: Affect is labile and flat.          Speech: Speech is delayed.         Behavior: Behavior is slowed. Behavior is cooperative.         CRANIAL NERVES     CN I  cranial nerve I not tested    CN II   Visual fields full to confrontation.     CN III, IV, VI   Pupils are equal, round, and reactive to light.  Extraocular motions are normal.   Right pupil: Accommodation: intact.   Left pupil: Accommodation: intact.   CN III: no CN III palsy  CN VI: no CN VI palsy    CN V   Facial sensation intact.   Right facial sensation deficit: none  Left facial sensation deficit: none    CN VII   Facial expression full, symmetric.   Right facial weakness: none  Left facial weakness: none    CN VIII   CN VIII normal.   Hearing: intact    CN IX, X   CN IX normal.   CN X normal.   Palate: symmetric    CN XI   CN XI normal.   Right sternocleidomastoid strength: normal  Left sternocleidomastoid strength: normal  Right trapezius strength: normal  Left trapezius strength: normal    CN XII   CN XII normal.   Tongue: not atrophic  Fasciculations: absent  Tongue deviation: none         Significant Labs: All pertinent labs within the past 24 hours have been reviewed.  Recent Lab Results         03/14/24  0448        Cholesterol Total 140       Gluc Fast 80       HDL 48       LDL Cholesterol 81.00       Syphilis Antibody Nonreactive       Total Cholesterol/HDL Ratio 3       Triglycerides 54       Very Low Density Lipoprotein 11               Significant Imaging: I have reviewed all pertinent imaging results/findings within the past 24 hours.  Assessment/Plan:     Acute psychosis  Mgt per psyc.  Rec cessation of illegal substances.   VS and labs reviewed and stable.        Tobacco use  Recommend cessation.        VTE Risk Mitigation (From admission, onward)      None                Thank you for your consult. I will sign off. Please contact us if you have any additional questions.    MATT MARTINEZ,   Department of Hospital Medicine   [facility]

## 2024-03-14 NOTE — GROUP NOTE
Group Psychotherapy       Group Focus: Psychodynamic Group Psychotherapy      Number of patients in attendance: 1    Group Start Time: 0900  Group End Time:  0930  Groups Date: 3/14/2024  Group Topic:  Behavioral Health  Group Department: [facility]  Group Facilitators:  Edgar De León LCSW  _____________________________________________________________________    Patient Name: Felicita Victoria  MRN: [id number]  Patient Class: IP- Psych   Admission Date\Time: 3/13/2024  9:00 PM  Hospital Length of Stay: 1  Primary Care Provider: Cherrie, Primary Doctor     Referred by: Behavioral Medicine Unit Treatment Team     Target symptoms: Mood Disorder     Patient's response to treatment: Not Participating     Progress toward goals: Not progressing     Interval History: Pt did not attend group despite prompts to do so.     Diagnosis:      Plan: Continue treatment on BMU
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
## 2024-03-14 NOTE — ASSESSMENT & PLAN NOTE
Patient this time be evaluated for appropriate trials of atypical antipsychotics conjunction with utilization of mood stabilizer such as Trileptal versus Depakote.  Patient was clearly has evidence of persistent psychosis, impairment judgment this time need about neuroleptic therapy as well as mood stabilizer and re-evaluate.

## 2024-03-14 NOTE — PATIENT CARE CONFERENCE
Spoke with pt's mother.  She states that Felicita usually functions good and takes care of her responsibility but has a lot of anxiety.  She states pt was hospitalized at [facility] one year ago but did not followup.  She states this episode began last Sunday on 3/10 and she has continued to become more bizarre.  She states that patient is under a lot of work stress and also that her father has been dx with dementia at age 58 and is on hospice.  Mother reports that there is a history of bipolar and schizophrenia in the family.

## 2024-03-14 NOTE — H&P
[facility]  Psychiatry  History & Physical    Patient Name: Felicita Victoria  MRN: [id number]   Code Status: Full Code  Admission Date: 3/13/2024  Attending Physician: Angel Kerns MD   Primary Care Provider: Cherrie, Primary Doctor    Current Legal Status:  Patient was emergency commitment by physician    Patient information was obtained from patient and ER records.     Subjective:     Principal Problem:Bipolar 1 disorder, mixed, severe    Chief Complaint:  The police pulled up on me I was laying on the ground.  They took him to the hospital.  Confused and does not understand why I am here      HPI: 33-year-old white female who at this time now presents to this facility involuntarily.  Patient this time presents after recently being found on the ground by police and subsequently taken to area emergency room where she was assessed and subsequently placed on physician's emergency commitment.    Patient was she had not be noted to be a very reliable historian.  Today she was extremely guarded and does not make any disclosures.  She shows evidence of which she does have a psychotic stare and looks right 3 you.      Patient this time does not attest to any recent changes in medications.  She was not attest to any recent significant life stress.  Patient was attributes the current level of stress as to the reason she was here at the hospital at this time.      Significant overall history patient does have a history of least 2 prior inpatient hospitalizations.  The last occurring in the springtime of 2023.  Patient appears to be in the throes of something bipolar range with a impulsivity.      Patient this time describes her symptom complexes characterized by the following:.  Ever present anxiety   2. Episodes of racing thoughts   3. Marked ruminations about past events 4.  Grave impairment judgment  5.  Nonadherence to medications and treatment   6. Apparent persistent ongoing response  to internal stimuli evidence of ever present increasing threshold paranoia.    Patient was extremely guarded throughout the session and very difficult to interview at this time.      Patient was questioned about uses of substances she was very evasive at this time.  She states she does use tobacco products.      When questioned about overall issues of sexual, physical and emotional trauma in her youth she was essentially shuts down.  He does not discuss issues from her childhood or young adulthood.  She just appears to shut down as we attempt to collect data this area.      Patient denies any aggressive or assaultive behavior towards others although apparently became quite agitated at the facility.  Patient was while in the emergency room did require IM medications believe also physical restraints.      Patient this time has no significant history of use of alcohol or other illicit chemicals.  She was readily admits to the use of cannabis to this provider.    Patient denies any aggressive assaultive behavior towards others.  Patient denies a current plans to self injure.                 Patient History               Medical as of 3/14/2024       Past Medical History       Diagnosis Date Comments Source    Anxiety disorder, unspecified -- -- Provider    Bipolar disorder, unspecified -- -- Provider    Hallucination -- -- Provider    History of psychiatric hospitalization -- -- Provider    Hx of psychiatric care -- -- Provider    Psychiatric problem -- -- Provider    Psychosis -- -- Provider    Sleep difficulties -- -- Provider    Substance abuse -- -- Provider    Withdrawal symptoms, alcohol -- -- Provider                          Surgical as of 3/14/2024    Past Surgical History: Patient provided no pertinent surgical history.               Family as of 3/14/2024       Problem Relation Name Age of Onset Comments Source    Suicide Mother -- -- -- Provider    Bipolar disorder Mother -- -- -- Provider    Suicide Sister  -- -- -- Provider    Depression Sister -- -- -- Provider                  Tobacco Use as of 3/14/2024       Smoking Status Smoking Start Date Last Attempt to Quit Current Packs/Day Average Packs/Day    Every Day -- -- --       Smokeless Status Smokeless Type Smokeless Quit Date    Never -- --      Source    Provider                  Alcohol Use as of 3/14/2024    None               Drug Use as of 3/14/2024       Drug Use Types Frequency Comments Source    Not Currently -- -- -- Provider                  Sexual Activity as of 3/14/2024       Sexually Active Birth Control Partners Comments Source    Not Asked -- -- -- Provider                  Activities of Daily Living as of 3/14/2024    None               Social Documentation as of 3/14/2024    Patient was mother of 1 live birth.  She states that child is currently with family members.  Patient was in the past has been able to maintain employment but at this time is unemployed.  She currently living in housing with her child and herself.  She does work.  She does not describe where she works.  She had not had any stated legal difficulties.      Patient this time continued in the initial assessment to have positive response to internal stimuli.   Source: Provider               Occupational as of 3/14/2024    None               Socioeconomic as of 3/14/2024       Marital Status Spouse Name Number of Children Years Education Education Level Preferred Language Ethnicity Race Source    Single -- -- -- -- English Not  or /a White --                  Pertinent History       Question Response Comments    Lives with family --    Place in Birth Order -- --    Lives in home --    Number of Siblings -- --    Raised by biological parents --    Legal Involvement civil and criminal litigation --    Childhood Trauma early trauma --    Criminal History of -- --    Financial Status employed --    Highest Level of Education unfinished highschool --    Does patient have  access to a firearm? No --     Service No --    Primary Leisure Activity -- --    Spirituality -- --          Past Medical History:   Diagnosis Date    Anxiety disorder, unspecified     Bipolar disorder, unspecified     Hallucination     History of psychiatric hospitalization     Hx of psychiatric care     Psychiatric problem     Psychosis     Sleep difficulties     Substance abuse     Withdrawal symptoms, alcohol      No past surgical history on file.  Family History       Problem Relation (Age of Onset)    Bipolar disorder Mother    Depression Sister    Suicide Mother, Sister          Tobacco Use    Smoking status: Every Day     Types: Cigarettes    Smokeless tobacco: Never   Substance and Sexual Activity    Alcohol use: Not on file    Drug use: Not Currently    Sexual activity: Not on file     Review of patient's allergies indicates:   Allergen Reactions    Codeine Anaphylaxis and Rash    Hydrocodone-acetaminophen Other (See Comments) and Swelling     Facial and mouth swelling    Diphenhydramine-pseudoephed     Diphenhydramine hcl Itching and Rash       No current facility-administered medications on file prior to encounter.     Current Outpatient Medications on File Prior to Encounter   Medication Sig    busPIRone (BUSPAR) 15 MG tablet Take 15 mg by mouth 2 (two) times daily.    famotidine (PEPCID) 20 MG tablet Take 1 tablet (20 mg total) by mouth once daily.     Psychotherapeutics (From admission, onward)      Start     Stop Route Frequency Ordered    03/14/24 2100  OLANZapine zydis disintegrating tablet 5 mg         -- Oral Nightly 03/13/24 2140 03/13/24 2145  LORazepam tablet 1 mg         -- Oral 3 times daily 03/13/24 2141 03/13/24 2141  OLANZapine zydis disintegrating tablet 10 mg         -- Oral Every 8 hours PRN 03/13/24 2141          Review of Systems   Psychiatric/Behavioral:  Positive for agitation, confusion, decreased concentration, dysphoric mood, hallucinations, sleep disturbance and  "suicidal ideas.    Strengths and Liabilities: Strength: Patient is expressive/articulate., Strength: Patient is stable., Liability: Patient is defensive., Liability: Patient is dependent., Liability: Patient is impulsive., Liability: Patient has intellectual deficits.    Objective:     Vital Signs (Most Recent):  Temp: 98 °F (36.7 °C) (03/14/24 0613)  Pulse: 110 (03/14/24 0613)  Resp: 18 (03/14/24 0613)  BP: 119/82 (03/14/24 0613)  SpO2: 96 % (03/14/24 0613) Vital Signs (24h Range):  Temp:  [97.8 °F (36.6 °C)-98 °F (36.7 °C)] 98 °F (36.7 °C)  Pulse:  [] 110  Resp:  [18] 18  SpO2:  [96 %-98 %] 96 %  BP: (113-119)/(73-85) 119/82     Height: 5' 4" (162.6 cm)  Weight: 40.2 kg (88 lb 9.6 oz)  Body mass index is 15.21 kg/m².    No intake or output data in the 24 hours ending 03/14/24 1534       Physical Exam    Mental status examination:  33-year-old white female whose affect at this time is extremely blunted to flat.  He was makes minimal if any disclosures at this time.  Her speech is slow self and sometimes difficult to understand.  Her thought content demonstrated no clear evidence of any visual hallucinations.  She did not acknowledge any auditory hallucinations this time.  She remained hypervigilant and appeared to be quite fearful.  She did not confirm any intention to harm self.  She had not confirm any intention to harm others.  There continued to be this global element of paranoia the appears to be ever present.  Her insight and judgment deemed to be poor.      On cognitive evaluation he was alert oriented to situation setting.  She showed persistent disorganization and markedly inattentive she could not complete overall cognitive assessment at this time.  We will have to rely upon repeat this assessment in the next 24-48 hours.        Significant Labs: Last 72 Hours:   Recent Lab Results         03/14/24  0448   03/13/24  1404   03/13/24  1353   03/13/24  1349        Influenza A, Molecular       Not " Detected       Influenza B, Molecular       Not Detected       Phencyclidine   Negative           Albumin/Globulin Ratio     1.4         Acetaminophen Level     <17.4         Albumin     4.7         Alcohol, Serum     <10.0  Comment: This assay is performed for medical purposes only.         ALP     65         ALT     20         Amphetamines, Urine   Negative           Appearance, UA   Slightly Cloudy           AST     26         Bacteria, UA   Rare           Barbituates, Urine   Negative           Baso #     0.03         Basophil %     0.3         Benzodiazepine, Urine   Negative           BILIRUBIN TOTAL     1.0         Bilirubin, UA   Small           BUN     12.9         Calcium     10.1         Cannabinoids, Urine   Positive           Chloride     102         Cholesterol Total 140             CO2     20         Cocaine, Urine   Negative           Color, UA   Yellow           Creatinine     0.76         eGFR     >60         Eos #     0.01         Eos %     0.1         Globulin, Total     3.3         Gluc Fast 80             Glucose     91         Glucose, UA   Negative           HDL 48             Hematocrit     42.1         Hemoglobin     14.7         Immature Grans (Abs)     0.00         Immature Granulocytes     0.0         Ketones, UA   80           LDL Cholesterol 81.00             Leukocyte Esterase, UA   Negative           Lymph #     2.19         LYMPH %     22.8         MCH     31.7         MCHC     34.9         MCV     90.9         Mono #     0.52         Mono %     5.4         MPV     10.7         Neut #     6.84         Neut %     71.4         NITRITE UA   Negative           Blood, UA   Large           Opiates, Urine   Negative           pH, UA   5.5           pH, Urine   5.5           Platelet Count     193         Potassium     3.8         hCG Qualitative, Urine   Negative           PROTEIN TOTAL     8.0         Protein, UA   100           RBC     4.63         RBC, UA   51-99           RDW      12.4         Salicylate Level     <5.0         SARS-CoV2 (COVID-19) Qualitative PCR       Not Detected       Sodium     139         Specific Gravity,UA   >=1.030           Specific Gravity, Urine Auto   >=1.030           Squam Epithel, UA   Few           Syphilis Antibody Nonreactive             Total Cholesterol/HDL Ratio 3             Triglycerides 54             Urobilinogen, UA   0.2           Very Low Density Lipoprotein 11             WBC, UA   None Seen           WBC     9.59                 Significant Imaging: None  Assessment/Plan:     * Bipolar 1 disorder, mixed, severe  Patient this time be evaluated for appropriate trials of atypical antipsychotics conjunction with utilization of mood stabilizer such as Trileptal versus Depakote.  Patient was clearly has evidence of persistent psychosis, impairment judgment this time need about neuroleptic therapy as well as mood stabilizer and re-evaluate.       Estimated Discharge Date: 3/21/2024  Initial Discharge Plan: Home      Prognosis: Fair    Need for Continued Hospitalization:   Psychiatric illness continues to pose a potential threat to life or bodily function, of self or others, thereby requiring the need for continued inpatient psychiatric hospitalization., Protective inpatient psychiatric hospitalization required while a safe disposition plan is enacted., and Requires ongoing hospitalization for stabilization of medications.    Total Time: 50 with greater than 50% of time spent in counseling and/or coordination of care.     Angel Kerns MD   Psychiatry  [facility]

## 2024-03-14 NOTE — HPI
34 yo CF with PMH of manic bipolar was brought to [facility] ED by PD for bizarre behavior in public. UDS + for cannabinoids. Pt PEC'd and transferred to [facility] for further treatment. Pt denies CP, SOB, trouble tasting or smelling. She was cooperative for examination.

## 2024-03-14 NOTE — NURSING
"Daily Nursing Note:      Behavior:    Patient (Felicita Victoria is a 33 y.o. female, : 1990, MRN: [id number]) demonstrating an affect that was sad, flat, anxious, irritable, agitated, angry, and inappropriate. Felicita demonstrating mood that is depressed, angry, and anxious. Felicita had an appearance that was disheveled. Felicita denies suicidal ideation. Felicita denies suicide plan. Felicita denies homicidal ideation. Felicita denies hallucinations.    Felicita's  height is 5' 4" (1.626 m) and weight is 40.2 kg (88 lb 9.6 oz). Her temperature is 98 °F (36.7 °C). Her blood pressure is 119/82 and her pulse is 110. Her respiration is 18 and oxygen saturation is 96%.     Jaimes last BM was noted on: 3/11/24 per patient      Intervention:    Encourage Felicita to perform self-hygiene, grooming, and changing of clothing. Monitor Felicita's behavior and program compliance. Monitor Felicita for suicidal ideation, homicidal ideation, sleep disturbance, and hallucinations. Encourage Felicita to eat all portions of meals and assess for meal preferences. Monitor Felicita for intake and output to ensure hydration. Notify the Physician for any medication refusal and any change in patient condition.      Response:    Felicita does not verbalize understanding of unit process and procedures but can be redirected to follow protocol with noted irritation when she is spoken to. Felicita is not compliant with all medications. She took her Zydis and nicotine patch this morning but refused her ativan. Her afternoon dose of ativan was taken with some education and coaxing. She has not slept today. Night shift states she slept until midnight and she remained awake ever since. She has paced the hallway all day. She asked to shower and then once in bathroom she refused the shower. She refused activities but did sit in activity room for a little while. She reports anxiety of 2/10 and when asked if she was depressed she states " "she "always looks like this". This morning she was fixated to go outside but lost interest after it was explained that this would not occur today. Denies SI/HI or hallucinations. While not pacing the floors she will remain in a stoic stance, staring forward and blank for up to a minimum of 10-15minutes at a time. She is walking slower as time progresses, is taking more sitting breaks, and does appear less agitated but still remains in a psychotic state.      Plan:     Continue to monitor per MD orders; maintain patient safety. Q15 minute safety checks in place. MD has seen patient and we are awaiting future orders.  "

## 2024-03-14 NOTE — NURSING
Zyprexa zydis 10mg po given for agitation.  Pt refused Scheduled ativan 1mg.   Medication education provided.   Pt verbalized understanding.  MD aware.

## 2024-03-14 NOTE — PROGRESS NOTES
Recreation Therapy Progress Note    Date:  03/14/2024    Time:  1400    Group Title:  Leisure skills    Mood:  Patient has flat affect.  Patient is depressed and very irritable    Behavior:  Patient attends group.  Patient has sad at group table today which appears which was not improved leave from just standing.  Patient refused to assignment in recreational therapy of her interests.  Patient stared staff and peers.    Affect:  Patient is flat and withdrawn affect.  Patient seems very depressed and very irritable    Speech:  Patient talks very minimal with motivation from staff    Cognition:  Patient is alert but has distracted thinking    Participation Level:  Patient participate in recreational therapy and minimal level.  Patient lives group after 30 minutes and went paced and walk down the halls    Intervention:  Continue to motivate patient to attend recreational therapy group and to participate to her abilities to reach goals and objectives in RT services.  Assist patient one-to-one as needed to achieve goals          Recreation Therapist   Signature:  Deborah Basilio MA CTRS

## 2024-03-14 NOTE — SUBJECTIVE & OBJECTIVE
Patient History               Medical as of 3/14/2024       Past Medical History       Diagnosis Date Comments Source    Anxiety disorder, unspecified -- -- Provider    Bipolar disorder, unspecified -- -- Provider    Hallucination -- -- Provider    History of psychiatric hospitalization -- -- Provider    Hx of psychiatric care -- -- Provider    Psychiatric problem -- -- Provider    Psychosis -- -- Provider    Sleep difficulties -- -- Provider    Substance abuse -- -- Provider    Withdrawal symptoms, alcohol -- -- Provider                          Surgical as of 3/14/2024    Past Surgical History: Patient provided no pertinent surgical history.               Family as of 3/14/2024       Problem Relation Name Age of Onset Comments Source    Suicide Mother -- -- -- Provider    Bipolar disorder Mother -- -- -- Provider    Suicide Sister -- -- -- Provider    Depression Sister -- -- -- Provider                  Tobacco Use as of 3/14/2024       Smoking Status Smoking Start Date Last Attempt to Quit Current Packs/Day Average Packs/Day    Every Day -- -- --       Smokeless Status Smokeless Type Smokeless Quit Date    Never -- --      Source    Provider                  Alcohol Use as of 3/14/2024    None               Drug Use as of 3/14/2024       Drug Use Types Frequency Comments Source    Not Currently -- -- -- Provider                  Sexual Activity as of 3/14/2024       Sexually Active Birth Control Partners Comments Source    Not Asked -- -- -- Provider                  Activities of Daily Living as of 3/14/2024    None               Social Documentation as of 3/14/2024    Patient was mother of 1 live birth.  She states that child is currently with family members.  Patient was in the past has been able to maintain employment but at this time is unemployed.  She currently living in housing with her child and herself.  She does work.  She does not describe where she works.  She had not had any stated legal  difficulties.      Patient this time continued in the initial assessment to have positive response to internal stimuli.   Source: Provider               Occupational as of 3/14/2024    None               Socioeconomic as of 3/14/2024       Marital Status Spouse Name Number of Children Years Education Education Level Preferred Language Ethnicity Race Source    Single -- -- -- -- English Not  or /a White --                  Pertinent History       Question Response Comments    Lives with family --    Place in Birth Order -- --    Lives in home --    Number of Siblings -- --    Raised by biological parents --    Legal Involvement civil and criminal litigation --    Childhood Trauma early trauma --    Criminal History of -- --    Financial Status employed --    Highest Level of Education unfinished highschool --    Does patient have access to a firearm? No --     Service No --    Primary Leisure Activity -- --    Spirituality -- --          Past Medical History:   Diagnosis Date    Anxiety disorder, unspecified     Bipolar disorder, unspecified     Hallucination     History of psychiatric hospitalization     Hx of psychiatric care     Psychiatric problem     Psychosis     Sleep difficulties     Substance abuse     Withdrawal symptoms, alcohol      No past surgical history on file.  Family History       Problem Relation (Age of Onset)    Bipolar disorder Mother    Depression Sister    Suicide Mother, Sister          Tobacco Use    Smoking status: Every Day     Types: Cigarettes    Smokeless tobacco: Never   Substance and Sexual Activity    Alcohol use: Not on file    Drug use: Not Currently    Sexual activity: Not on file     Review of patient's allergies indicates:   Allergen Reactions    Codeine Anaphylaxis and Rash    Hydrocodone-acetaminophen Other (See Comments) and Swelling     Facial and mouth swelling    Diphenhydramine-pseudoephed     Diphenhydramine hcl Itching and Rash       No current  "facility-administered medications on file prior to encounter.     Current Outpatient Medications on File Prior to Encounter   Medication Sig    busPIRone (BUSPAR) 15 MG tablet Take 15 mg by mouth 2 (two) times daily.    famotidine (PEPCID) 20 MG tablet Take 1 tablet (20 mg total) by mouth once daily.     Psychotherapeutics (From admission, onward)      Start     Stop Route Frequency Ordered    03/14/24 2100  OLANZapine zydis disintegrating tablet 5 mg         -- Oral Nightly 03/13/24 2140 03/13/24 2145  LORazepam tablet 1 mg         -- Oral 3 times daily 03/13/24 2141 03/13/24 2141  OLANZapine zydis disintegrating tablet 10 mg         -- Oral Every 8 hours PRN 03/13/24 2141          Review of Systems   Psychiatric/Behavioral:  Positive for agitation, confusion, decreased concentration, dysphoric mood, hallucinations, sleep disturbance and suicidal ideas.    Strengths and Liabilities: Strength: Patient is expressive/articulate., Strength: Patient is stable., Liability: Patient is defensive., Liability: Patient is dependent., Liability: Patient is impulsive., Liability: Patient has intellectual deficits.    Objective:     Vital Signs (Most Recent):  Temp: 98 °F (36.7 °C) (03/14/24 0613)  Pulse: 110 (03/14/24 0613)  Resp: 18 (03/14/24 0613)  BP: 119/82 (03/14/24 0613)  SpO2: 96 % (03/14/24 0613) Vital Signs (24h Range):  Temp:  [97.8 °F (36.6 °C)-98 °F (36.7 °C)] 98 °F (36.7 °C)  Pulse:  [] 110  Resp:  [18] 18  SpO2:  [96 %-98 %] 96 %  BP: (113-119)/(73-85) 119/82     Height: 5' 4" (162.6 cm)  Weight: 40.2 kg (88 lb 9.6 oz)  Body mass index is 15.21 kg/m².    No intake or output data in the 24 hours ending 03/14/24 1534       Physical Exam    Mental status examination:  33-year-old white female whose affect at this time is extremely blunted to flat.  He was makes minimal if any disclosures at this time.  Her speech is slow self and sometimes difficult to understand.  Her thought content demonstrated no " clear evidence of any visual hallucinations.  She did not acknowledge any auditory hallucinations this time.  She remained hypervigilant and appeared to be quite fearful.  She did not confirm any intention to harm self.  She had not confirm any intention to harm others.  There continued to be this global element of paranoia the appears to be ever present.  Her insight and judgment deemed to be poor.      On cognitive evaluation he was alert oriented to situation setting.  She showed persistent disorganization and markedly inattentive she could not complete overall cognitive assessment at this time.  We will have to rely upon repeat this assessment in the next 24-48 hours.        Significant Labs: Last 72 Hours:   Recent Lab Results         03/14/24  0448   03/13/24  1404   03/13/24  1353   03/13/24  1349        Influenza A, Molecular       Not Detected       Influenza B, Molecular       Not Detected       Phencyclidine   Negative           Albumin/Globulin Ratio     1.4         Acetaminophen Level     <17.4         Albumin     4.7         Alcohol, Serum     <10.0  Comment: This assay is performed for medical purposes only.         ALP     65         ALT     20         Amphetamines, Urine   Negative           Appearance, UA   Slightly Cloudy           AST     26         Bacteria, UA   Rare           Barbituates, Urine   Negative           Baso #     0.03         Basophil %     0.3         Benzodiazepine, Urine   Negative           BILIRUBIN TOTAL     1.0         Bilirubin, UA   Small           BUN     12.9         Calcium     10.1         Cannabinoids, Urine   Positive           Chloride     102         Cholesterol Total 140             CO2     20         Cocaine, Urine   Negative           Color, UA   Yellow           Creatinine     0.76         eGFR     >60         Eos #     0.01         Eos %     0.1         Globulin, Total     3.3         Gluc Fast 80             Glucose     91         Glucose, UA   Negative            HDL 48             Hematocrit     42.1         Hemoglobin     14.7         Immature Grans (Abs)     0.00         Immature Granulocytes     0.0         Ketones, UA   80           LDL Cholesterol 81.00             Leukocyte Esterase, UA   Negative           Lymph #     2.19         LYMPH %     22.8         MCH     31.7         MCHC     34.9         MCV     90.9         Mono #     0.52         Mono %     5.4         MPV     10.7         Neut #     6.84         Neut %     71.4         NITRITE UA   Negative           Blood, UA   Large           Opiates, Urine   Negative           pH, UA   5.5           pH, Urine   5.5           Platelet Count     193         Potassium     3.8         hCG Qualitative, Urine   Negative           PROTEIN TOTAL     8.0         Protein, UA   100           RBC     4.63         RBC, UA   51-99           RDW     12.4         Salicylate Level     <5.0         SARS-CoV2 (COVID-19) Qualitative PCR       Not Detected       Sodium     139         Specific Gravity,UA   >=1.030           Specific Gravity, Urine Auto   >=1.030           Squam Epithel, UA   Few           Syphilis Antibody Nonreactive             Total Cholesterol/HDL Ratio 3             Triglycerides 54             Urobilinogen, UA   0.2           Very Low Density Lipoprotein 11             WBC, UA   None Seen           WBC     9.59                 Significant Imaging: None

## 2024-03-14 NOTE — PROGRESS NOTES
Recreation Therapy Progress Note    Date:  03/14/2024    Time:  10:00 a.m.    Group Title:  Creative expression    Mood:  Patient has flat affect depressed very anxious.  Patient stand up in group and stared the entire time    Behavior:  Patient did attend group recreational therapy services.  Patient very guarded withdrawn patient seems to have restricted thought process.  Has to [facility] to answer simple questions.  Patient stayed in group but was very guarded.  Patient refused to participate but stayed in group with motivation and prompts from staff    Affect:  Patient has a flat affect he is very depressed and anxious    Speech:  Patient's speech is very very low volume.  Patient gets very irritable in order to talk to anyone.    Cognition:  Patient is alert but seems to have distracted thinking.  Participation Level:  Patient attended group patient withdrawn and isolated and guarded in group.  Patient needed prompts to to attend group.  Patient kept requesting to go outside.  Patient is irritable if needed by the tries to interact with her.  Patient stood up in group and wants to entire time and very guarded.    Intervention:  Continue RT services assist patient one-to-one as needed to complete goals and objectives in RT services.          Recreation Therapist   Signature:  Deborah Basilio ma ctrs

## 2024-03-14 NOTE — SUBJECTIVE & OBJECTIVE
Past Medical History:   Diagnosis Date    Anxiety disorder, unspecified     Bipolar disorder, unspecified        No past surgical history on file.    Review of patient's allergies indicates:   Allergen Reactions    Codeine Anaphylaxis and Rash    Hydrocodone-acetaminophen Other (See Comments) and Swelling     Facial and mouth swelling    Diphenhydramine-pseudoephed     Diphenhydramine hcl Itching and Rash       Current Facility-Administered Medications on File Prior to Encounter   Medication    [COMPLETED] LORazepam injection 2 mg    [COMPLETED] ziprasidone injection 10 mg     Current Outpatient Medications on File Prior to Encounter   Medication Sig    busPIRone (BUSPAR) 15 MG tablet Take 15 mg by mouth 2 (two) times daily.    famotidine (PEPCID) 20 MG tablet Take 1 tablet (20 mg total) by mouth once daily.     Family History    None       Tobacco Use    Smoking status: Every Day     Types: Cigarettes    Smokeless tobacco: Never   Substance and Sexual Activity    Alcohol use: Not on file    Drug use: Not Currently    Sexual activity: Not on file     Review of Systems   Constitutional:  Negative for fever.   HENT: Negative.     Eyes: Negative.    Respiratory: Negative.  Negative for shortness of breath.    Cardiovascular:  Negative for chest pain.   Gastrointestinal:  Negative for abdominal distention, abdominal pain, nausea and vomiting.   Musculoskeletal:  Negative for back pain.   Neurological: Negative.    Psychiatric/Behavioral:  Positive for behavioral problems and dysphoric mood.      Objective:     Vital Signs (Most Recent):  Temp: 98 °F (36.7 °C) (03/14/24 0613)  Pulse: 110 (03/14/24 0613)  Resp: 18 (03/14/24 0613)  BP: 119/82 (03/14/24 0613)  SpO2: 96 % (03/14/24 0613) Vital Signs (24h Range):  Temp:  [97.8 °F (36.6 °C)-98 °F (36.7 °C)] 98 °F (36.7 °C)  Pulse:  [] 110  Resp:  [18] 18  SpO2:  [96 %-98 %] 96 %  BP: (113-119)/(73-85) 119/82     Weight: 40.2 kg (88 lb 9.6 oz)  Body mass index is 15.21  kg/m².     Physical Exam  Vitals and nursing note reviewed. Exam conducted with a chaperone present.   Constitutional:       General: She is not in acute distress.  HENT:      Head: Normocephalic and atraumatic.      Nose: Nose normal.      Mouth/Throat:      Mouth: Mucous membranes are moist.   Eyes:      Extraocular Movements: Extraocular movements intact and EOM normal.      Conjunctiva/sclera: Conjunctivae normal.      Pupils: Pupils are equal, round, and reactive to light.   Cardiovascular:      Rate and Rhythm: Normal rate and regular rhythm.      Heart sounds: Normal heart sounds. No murmur heard.     No gallop.   Pulmonary:      Effort: Pulmonary effort is normal.      Breath sounds: Normal breath sounds. No wheezing, rhonchi or rales.   Musculoskeletal:         General: No swelling or deformity. Normal range of motion.      Cervical back: Normal range of motion and neck supple.   Skin:     General: Skin is warm and dry.   Neurological:      General: No focal deficit present.      Mental Status: She is alert.      Gait: Gait normal.   Psychiatric:         Mood and Affect: Affect is labile and flat.         Speech: Speech is delayed.         Behavior: Behavior is slowed. Behavior is cooperative.         CRANIAL NERVES     CN I  cranial nerve I not tested    CN II   Visual fields full to confrontation.     CN III, IV, VI   Pupils are equal, round, and reactive to light.  Extraocular motions are normal.   Right pupil: Accommodation: intact.   Left pupil: Accommodation: intact.   CN III: no CN III palsy  CN VI: no CN VI palsy    CN V   Facial sensation intact.   Right facial sensation deficit: none  Left facial sensation deficit: none    CN VII   Facial expression full, symmetric.   Right facial weakness: none  Left facial weakness: none    CN VIII   CN VIII normal.   Hearing: intact    CN IX, X   CN IX normal.   CN X normal.   Palate: symmetric    CN XI   CN XI normal.   Right sternocleidomastoid strength:  normal  Left sternocleidomastoid strength: normal  Right trapezius strength: normal  Left trapezius strength: normal    CN XII   CN XII normal.   Tongue: not atrophic  Fasciculations: absent  Tongue deviation: none         Significant Labs: All pertinent labs within the past 24 hours have been reviewed.  Recent Lab Results         03/14/24  0448        Cholesterol Total 140       Gluc Fast 80       HDL 48       LDL Cholesterol 81.00       Syphilis Antibody Nonreactive       Total Cholesterol/HDL Ratio 3       Triglycerides 54       Very Low Density Lipoprotein 11               Significant Imaging: I have reviewed all pertinent imaging results/findings within the past 24 hours.

## 2024-03-14 NOTE — PLAN OF CARE
Problem: Violence Risk or Actual  Goal: Anger and Impulse Control  Intervention: Minimize Safety Risk  Flowsheets (Taken 3/14/2024 1614)  Behavior Management: boundaries reinforced  Intervention: Promote Self-Control  Flowsheets (Taken 3/14/2024 1614)  Supportive Measures:   self-care encouraged   verbalization of feelings encouraged   self-reflection promoted  Environmental Support: calm environment promoted     Problem: Behavior Regulation Impairment (Psychotic Signs/Symptoms)  Goal: Improved Behavioral Control (Psychotic Signs/Symptoms)  Intervention: Manage Behavior  Flowsheets (Taken 3/14/2024 1614)  De-Escalation Techniques:   medication administered   medication offered   verbally redirected     Problem: Cognitive Impairment (Psychotic Signs/Symptoms)  Goal: Optimal Cognitive Function (Psychotic Signs/Symptoms)  Intervention: Support and Promote Cognitive Ability  Flowsheets (Taken 3/14/2024 1614)  Trust Relationship/Rapport:   care explained   questions encouraged   reassurance provided   thoughts/feelings acknowledged     Problem: Decreased Participation and Engagement (Psychotic Signs/Symptoms)  Goal: Increased Participation and Engagement (Psychotic Signs/Symptoms)  Intervention: Facilitate Participation and Engagement  Flowsheets (Taken 3/14/2024 1614)  Supportive Measures:   self-care encouraged   verbalization of feelings encouraged   self-reflection promoted     Problem: Mood Impairment (Psychotic Signs/Symptoms)  Goal: Improved Mood Symptoms (Psychotic Signs/Symptoms)  Intervention: Optimize Emotion and Mood  Flowsheets (Taken 3/14/2024 1614)  Supportive Measures:   self-care encouraged   verbalization of feelings encouraged   self-reflection promoted     Problem: Psychomotor Impairment (Psychotic Signs/Symptoms)  Goal: Improved Psychomotor Symptoms (Psychotic Signs/Symptoms)  Flowsheets (Taken 3/14/2024 1614)  Mutually Determined Action Steps (Improved Psychomotor Symptoms): exhibits decrease in  agitation  Intervention: Manage Psychomotor Movement  Flowsheets (Taken 3/14/2024 1614)  Activity (Behavioral Health): up ad kirk     Problem: Sleep Disturbance (Psychotic Signs/Symptoms)  Goal: Improved Sleep (Psychotic Signs/Symptoms)  Flowsheets (Taken 3/14/2024 1614)  Mutually Determined Action Steps (Improved Sleep): remains out of bed during day     Problem: Social, Occupational or Functional Impairment (Psychotic Signs/Symptoms)  Goal: Enhanced Social, Occupational or Functional Skills (Psychotic Signs/Symptoms)  Intervention: Promote Social, Occupational and Functional Ability  Flowsheets (Taken 3/14/2024 1614)  Trust Relationship/Rapport:   care explained   questions encouraged   reassurance provided   thoughts/feelings acknowledged  Social Functional Ability Promotion:   opportunity for activity provided   self-expression encouraged

## 2024-03-14 NOTE — PROGRESS NOTES
Recreation Therapy Assessment    1. MOOD:  Patient has flat depressed irritable mood throughout interview    2. BEHAVIOR:  Cooperative and was willing to talk to staff with very guarded and withdrawn    3. AFFECT:  Flat depressed irritable and anxious    4. COGNITION:  Patient was alert through interview but was very distracted    5. MOTOR BEHAVIOR/SKILLS:  Patient ambulatory    6. SPEECH:  Patient very low volume when talking and hesitate to answer questions    7. LEISURE FUNCTIONING:  Patient has declined and leisure skills interests    8. LEISURE NEEDS:  To regain positive social skills in positive leisure skills in daily life    9. PT EDUCATION LEVEL:  Patient has 12th grade education also patient has completed [employer] as she stated.  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    10. WHAT IS THE BEST THING THAT EVER HAPPENED TO YOU?  My child patient states    11. THINGS THAT FRUSTRATE AND ANGER ME ARE:  Patient states she does not low I she is in the hospital    12. WHEN I GET ANGRY, I USUALLY:  Fuss all walk away and keep [place] listed myself    13. MY RELATIONSHIP WITH MOST PEOPLE ARE:  A social patient states.  Patient really did not want to answer this question    14. LEISURE INTERESTS/SKILLS:  Patient states she enjoys playing with her low girl drawing reading movies as well as fishing    15. LEISURE BARRIERS:  Patient has declined in mental state.  Patient states she does not low why she is here in the hospital    16. ASSESSMENT SUMMARY:  Patient is a 33-year-old white female.  Patient resides in [place] we will [place].  Patient states she keeps in touch with her mom and her dad and her family.  Patient states she lives alone but then she stated she lives with some family.  Patient was very guarded in interview was hesitating with answering questions.  Patient was found under a car hallucinating and delusional.  Patient could not answer why she was in the hospital.    17.  [place] PLAN FOR RECREATION THERAPY:  Patient will receive recreational therapy services 2 times a day and 5 times a week with Deborah Basilio ma ctrs.  Patient will be assisted to complete 3-4 assignments on problem-solving skills and emotional outlets to enhance coping skills in daily life.  Patient will be assisted to complete 3-4 assignments on appropriate social skills leisure interests and participation to enhance self-worth, problem-solving skills, emotional outlets, quality of life and and coping skills in daily life.  Patient will utilize art music cognitive games and exercise to achieve these goals.  Assist patient one-to-one as needed to be successful at achieve her goals to enhance her quality of life wants discharge and at home       18. SIGNATURE/CREDENTIALS:  Deborah Basilio ma ctrs                                                                    DATE:   03/14/2024                           TIME:  8:31 a.m.        RECREATION THERAPIST  RED

## 2024-03-14 NOTE — PLAN OF CARE
Problem: Violence Risk or Actual  Goal: Anger and Impulse Control  Outcome: Ongoing, Progressing     Problem: Behavior Regulation Impairment (Psychotic Signs/Symptoms)  Goal: Improved Behavioral Control (Psychotic Signs/Symptoms)  Outcome: Ongoing, Progressing  Intervention: Manage Behavior  Flowsheets (Taken 3/14/2024 0344)  De-Escalation Techniques: appropriate behavior reinforced     Problem: Cognitive Impairment (Psychotic Signs/Symptoms)  Goal: Optimal Cognitive Function (Psychotic Signs/Symptoms)  Outcome: Ongoing, Progressing  Intervention: Support and Promote Cognitive Ability  Flowsheets (Taken 3/14/2024 0344)  Trust Relationship/Rapport:   care explained   choices provided   emotional support provided   empathic listening provided   questions answered   questions encouraged   reassurance provided   thoughts/feelings acknowledged     Problem: Decreased Participation and Engagement (Psychotic Signs/Symptoms)  Goal: Increased Participation and Engagement (Psychotic Signs/Symptoms)  Outcome: Ongoing, Progressing  Intervention: Facilitate Participation and Engagement  Flowsheets (Taken 3/14/2024 0344)  Supportive Measures:   active listening utilized   self-care encouraged     Problem: Mood Impairment (Psychotic Signs/Symptoms)  Goal: Improved Mood Symptoms (Psychotic Signs/Symptoms)  Outcome: Ongoing, Progressing  Intervention: Optimize Emotion and Mood  Flowsheets (Taken 3/14/2024 0344)  Supportive Measures:   active listening utilized   self-care encouraged     Problem: Psychomotor Impairment (Psychotic Signs/Symptoms)  Goal: Improved Psychomotor Symptoms (Psychotic Signs/Symptoms)  Outcome: Ongoing, Progressing  Intervention: Manage Psychomotor Movement  Flowsheets (Taken 3/14/2024 0344)  Activity (Behavioral Health): up ad kirk     Problem: Sensory Perception Impairment (Psychotic Signs/Symptoms)  Goal: Decreased Sensory Symptoms (Psychotic Signs/Symptoms)  Outcome: Ongoing, Progressing     Problem: Sleep  Disturbance (Psychotic Signs/Symptoms)  Goal: Improved Sleep (Psychotic Signs/Symptoms)  Outcome: Ongoing, Progressing  Intervention: Promote Healthy Sleep Hygiene  Flowsheets (Taken 3/14/2024 0344)  Sleep Hygiene Promotion: regular sleep pattern promoted     Problem: Social, Occupational or Functional Impairment (Psychotic Signs/Symptoms)  Goal: Enhanced Social, Occupational or Functional Skills (Psychotic Signs/Symptoms)  Outcome: Ongoing, Progressing  Intervention: Promote Social, Occupational and Functional Ability  Flowsheets (Taken 3/14/2024 0344)  Trust Relationship/Rapport:   care explained   choices provided   emotional support provided   empathic listening provided   questions answered   questions encouraged   reassurance provided   thoughts/feelings acknowledged  Social Functional Ability Promotion: autonomy promoted
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
## 2024-03-15 PROCEDURE — 25000003 PHARM REV CODE 250: Performed by: PSYCHIATRY & NEUROLOGY

## 2024-03-15 PROCEDURE — 11400000 HC PSYCH PRIVATE ROOM

## 2024-03-15 PROCEDURE — S4991 NICOTINE PATCH NONLEGEND: HCPCS | Performed by: PSYCHIATRY & NEUROLOGY

## 2024-03-15 RX ADMIN — LORAZEPAM 1 MG: 1 TABLET ORAL at 03:03

## 2024-03-15 RX ADMIN — LORAZEPAM 1 MG: 1 TABLET ORAL at 08:03

## 2024-03-15 RX ADMIN — NICOTINE 1 PATCH: 14 PATCH, EXTENDED RELEASE TRANSDERMAL at 08:03

## 2024-03-15 RX ADMIN — OXCARBAZEPINE 150 MG: 150 TABLET, FILM COATED ORAL at 08:03

## 2024-03-15 RX ADMIN — OLANZAPINE 5 MG: 5 TABLET, ORALLY DISINTEGRATING ORAL at 08:03

## 2024-03-15 NOTE — NURSING
"Daily Nursing Note:      Behavior:    Patient (Felicita Victoria is a 33 y.o. female, : 1990, MRN: [id number]) demonstrating an affect that was sad, flat, and anxious. Felicita demonstrating mood that is anxious. Felicita had an appearance that was clean. Felicita denies suicidal ideation. Felicita denies suicide plan. Felicita denies homicidal ideation. Felicita denies hallucinations.    Felicita's  height is 5' 4" (1.626 m) and weight is 40.2 kg (88 lb 9.6 oz). Her temperature is 98.1 °F (36.7 °C). Her blood pressure is 112/77 and her pulse is 126 (abnormal). Her respiration is 16 and oxygen saturation is 97%.     Felicita's last BM was noted on: 2024.  Up in dining room.  Speaks when spoken to with one word answers.  Needs coaxing to elaborate on feelings of anxiety which she rated 3/10.  Denies depression at this time.        Intervention:    Encouraged Felicita to perform self-hygiene, grooming, and changing of clothing. Monitored Felicita's behavior and program compliance. Monitor Felicita for suicidal ideation, homicidal ideation, sleep disturbance, and hallucinations. Encouraged  Felicita to eat all portions of meals and assess for meal preferences. Monitored Felicita for intake and output to ensure hydration. Will notify the Physician for any medication refusal and any change in patient condition.      Response:    Felicita verbalizes understand of unit process and procedures. Felicita reported no medications issues.        Plan:     Continue to monitor per MD orders; maintain patient safety with staff providing safety checks Q15min and prn.    "

## 2024-03-15 NOTE — NURSING
"Daily Nursing Note:      Behavior:    Patient (Felicita Victoria is a 33 y.o. female, : 1990, MRN: [id number]) demonstrating an affect that was sad, flat, and anxious. Felicita demonstrating mood that is depressed and anxious. Felicita had an appearance that was disheveled. Felicita denies suicidal ideation. Felicita denies suicide plan. Felicita denies homicidal ideation. Felicita denies hallucinations. She ate 50/25/25% of her meals today.    Felicita's  height is 5' 4" (1.626 m) and weight is 40.2 kg (88 lb 9.6 oz). Her temperature is 97.9 °F (36.6 °C). Her blood pressure is 109/80 and her pulse is 114 (abnormal). Her respiration is 18 and oxygen saturation is 96%.     Jaimes last BM was noted on: 3/14/24      Intervention:    Encourage Felicita to perform self-hygiene, grooming, and changing of clothing. Monitor Felicita's behavior and program compliance. Monitor Felicita for suicidal ideation, homicidal ideation, sleep disturbance, and hallucinations. Encourage Felicita to eat all portions of meals and assess for meal preferences. Monitor Felicita for intake and output to ensure hydration. Notify the Physician (MD) for any medication refusal and any change in patient condition.      Response:    Felicita verbalizes understand of unit process and procedures. Felicita is compliant with her medications.      Plan:     Continue to monitor per MD orders; maintain patient safety.    Q15min safety checks in progress. Assault precautions maintained.   "

## 2024-03-15 NOTE — GROUP NOTE
Group Psychotherapy       Group Focus: Promoting Healthy Lifestyles      Number of patients in attendance: 3    Group Start Time: 0415  Group End Time:  0445  Groups Date: 3/15/2024  Group Topic:  Behavioral Health  Group Department: [facility]  Group Facilitators:  Linda Gil RN  _____________________________________________________________________    Patient Name: Felicita Victoria  MRN: [id number]  Patient Class: IP- Psych   Admission Date\Time: 3/13/2024  9:00 PM  Hospital Length of Stay: 2  Primary Care Provider: Cherrie Primary Doctor     Referred by: Behavioral Medicine Unit Treatment Team     Target symptoms: Depression     Patient's response to treatment: Active Listening, Self-disclosure, and Feedback given to another patient     Progress toward goals: Progressing slowly     Interval History: attended and verbally participated     Diagnosis: Bipolar Disorder      Plan: Continue treatment on BMU

## 2024-03-15 NOTE — PLAN OF CARE
Problem: Violence Risk or Actual  Goal: Anger and Impulse Control  Outcome: Ongoing, Progressing  Intervention: Minimize Safety Risk  Flowsheets (Taken 3/14/2024 2117)  Behavior Management:   behavioral plan reviewed   boundaries reinforced   impulse control promoted  Intervention: Promote Self-Control  Flowsheets (Taken 3/14/2024 2117)  Supportive Measures:   self-care encouraged   self-responsibility promoted  Environmental Support: calm environment promoted     Problem: Behavior Regulation Impairment (Psychotic Signs/Symptoms)  Goal: Improved Behavioral Control (Psychotic Signs/Symptoms)  Outcome: Ongoing, Progressing  Intervention: Manage Behavior  Flowsheets (Taken 3/14/2024 2117)  De-Escalation Techniques: appropriate behavior reinforced     Problem: Cognitive Impairment (Psychotic Signs/Symptoms)  Goal: Optimal Cognitive Function (Psychotic Signs/Symptoms)  Outcome: Ongoing, Progressing  Intervention: Support and Promote Cognitive Ability  Flowsheets (Taken 3/14/2024 2117)  Trust Relationship/Rapport:   care explained   choices provided   emotional support provided   empathic listening provided   questions encouraged   thoughts/feelings acknowledged     Problem: Decreased Participation and Engagement (Psychotic Signs/Symptoms)  Goal: Increased Participation and Engagement (Psychotic Signs/Symptoms)  Outcome: Ongoing, Progressing  Intervention: Facilitate Participation and Engagement  Flowsheets (Taken 3/14/2024 2117)  Supportive Measures:   self-care encouraged   self-responsibility promoted     Problem: Mood Impairment (Psychotic Signs/Symptoms)  Goal: Improved Mood Symptoms (Psychotic Signs/Symptoms)  Outcome: Ongoing, Progressing  Intervention: Optimize Emotion and Mood  Flowsheets (Taken 3/14/2024 2117)  Supportive Measures:   self-care encouraged   self-responsibility promoted     Problem: Psychomotor Impairment (Psychotic Signs/Symptoms)  Goal: Improved Psychomotor Symptoms (Psychotic  Signs/Symptoms)  Outcome: Ongoing, Progressing     Problem: Sensory Perception Impairment (Psychotic Signs/Symptoms)  Goal: Decreased Sensory Symptoms (Psychotic Signs/Symptoms)  Outcome: Ongoing, Progressing     Problem: Sleep Disturbance (Psychotic Signs/Symptoms)  Goal: Improved Sleep (Psychotic Signs/Symptoms)  Outcome: Ongoing, Progressing  Intervention: Promote Healthy Sleep Hygiene  Flowsheets (Taken 3/14/2024 2117)  Sleep Hygiene Promotion:   awakenings minimized   regular sleep pattern promoted     Problem: Social, Occupational or Functional Impairment (Psychotic Signs/Symptoms)  Goal: Enhanced Social, Occupational or Functional Skills (Psychotic Signs/Symptoms)  Outcome: Ongoing, Progressing  Intervention: Promote Social, Occupational and Functional Ability  Flowsheets (Taken 3/14/2024 2117)  Trust Relationship/Rapport:   care explained   choices provided   emotional support provided   empathic listening provided   questions encouraged   thoughts/feelings acknowledged  Social Functional Ability Promotion: autonomy promoted

## 2024-03-15 NOTE — PROGRESS NOTES
Recreation Therapy Progress Note    Date:  03/15/2024    Time:  10:00 a.m.    Group Title:  Creative expression    Mood:  Patient mood is less guarded.  Patient less withdrawn.  Patient has been eye contact today.  And patient is less irritable.    Behavior:  Patient attended group and wanted to participate in recreational therapy art and music class.  Patient begin to talking class and more calm.  Patient has improved since yesterday    Affect:  Patient affect is still flat however a little brighter this morning.  Patient less guarded.  And less irritable    Speech:  Patient talking more with staff and peers today at minimal level.  Which is an improvement from yesterday    Cognition:  Patient was able to focused and complete art and music activity of her interests and request which was not improvement from yesterday.    Participation Level:  A Patient participate in group 100% in art and music activity as well as cognitive game with staff and peers.  Patient is less guarded and less irritable    Intervention:  Continue recreational therapy services.  Assist patient one-to-one as needed to complete her goals and objectives in RT services.          Recreation Therapist   Signature:  Deborah DE LA VEGAS
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
## 2024-03-15 NOTE — PROGRESS NOTES
Recreation Therapy Progress Note    Date:  03/15/2024    Time:  1400    Group Title:  Leisure skills    Mood:  Patient mood is less guarded.  Patient is participate in group.  Patient is talking in group more than she did in able to focused on task.  Patient is less guarded and in is improving.    Behavior:  Patient was willing to attend group and participate in music and art activity that helps her to relax    Affect:  Patient is less guarded.  Patient was willing to participate in group and her affect is brighter today    Speech:  Patient is talking more today and able to hold a conversation at minimum level    Cognition:  Patient is alert a lot more today and able to focused on task.  This is a big improvement from yesterday    Participation Level:  Patient participate in recreational therapy services leisure skills group at 100% with less one-to-one hands-on assistance than yesterday.    Intervention:  Continue to motivate patient to attend recreational therapy services and complete goals and objectives for recreational services on treatment plan          Recreation Therapist   Signature:  Deborah Basilio MA CTRS

## 2024-03-15 NOTE — PLAN OF CARE
Problem: Violence Risk or Actual  Goal: Anger and Impulse Control  Outcome: Ongoing, Progressing  Intervention: Minimize Safety Risk  Flowsheets (Taken 3/15/2024 1531)  Behavior Management: impulse control promoted  Sensory Stimulation Regulation: auditory stimulation provided  Intervention: Promote Self-Control  Flowsheets (Taken 3/15/2024 1531)  Supportive Measures:   active listening utilized   decision-making supported   positive reinforcement provided   counseling provided   problem-solving facilitated   relaxation techniques promoted   self-care encouraged     Problem: Behavior Regulation Impairment (Psychotic Signs/Symptoms)  Goal: Improved Behavioral Control (Psychotic Signs/Symptoms)  Outcome: Ongoing, Progressing  Intervention: Manage Behavior  Flowsheets (Taken 3/15/2024 1531)  De-Escalation Techniques:   appropriate behavior reinforced   medication administered   physical activity promoted   quiet time facilitated     Problem: Cognitive Impairment (Psychotic Signs/Symptoms)  Goal: Optimal Cognitive Function (Psychotic Signs/Symptoms)  Outcome: Ongoing, Progressing  Intervention: Support and Promote Cognitive Ability  Flowsheets (Taken 3/15/2024 1531)  Trust Relationship/Rapport:   care explained   choices provided   emotional support provided   empathic listening provided   questions encouraged     Problem: Decreased Participation and Engagement (Psychotic Signs/Symptoms)  Goal: Increased Participation and Engagement (Psychotic Signs/Symptoms)  Outcome: Ongoing, Progressing  Intervention: Facilitate Participation and Engagement  Flowsheets (Taken 3/15/2024 1531)  Supportive Measures:   active listening utilized   decision-making supported   positive reinforcement provided   counseling provided   problem-solving facilitated   relaxation techniques promoted   self-care encouraged     Problem: Mood Impairment (Psychotic Signs/Symptoms)  Goal: Improved Mood Symptoms (Psychotic Signs/Symptoms)  Outcome:  Ongoing, Progressing     Problem: Psychomotor Impairment (Psychotic Signs/Symptoms)  Goal: Improved Psychomotor Symptoms (Psychotic Signs/Symptoms)  Outcome: Ongoing, Progressing  Intervention: Manage Psychomotor Movement  Flowsheets (Taken 3/15/2024 1531)  Activity (Behavioral Health): up ad kirk     Problem: Sensory Perception Impairment (Psychotic Signs/Symptoms)  Goal: Decreased Sensory Symptoms (Psychotic Signs/Symptoms)  Outcome: Ongoing, Progressing  Intervention: Minimize and Manage Sensory Impairment  Flowsheets (Taken 3/15/2024 1531)  Sensory Stimulation Regulation: auditory stimulation provided     Problem: Sleep Disturbance (Psychotic Signs/Symptoms)  Goal: Improved Sleep (Psychotic Signs/Symptoms)  Outcome: Ongoing, Progressing  Intervention: Promote Healthy Sleep Hygiene  Flowsheets (Taken 3/15/2024 1531)  Sleep Hygiene Promotion:   regular sleep pattern promoted   relaxation techniques promoted     Problem: Social, Occupational or Functional Impairment (Psychotic Signs/Symptoms)  Goal: Enhanced Social, Occupational or Functional Skills (Psychotic Signs/Symptoms)  Outcome: Ongoing, Progressing  Intervention: Promote Social, Occupational and Functional Ability  Flowsheets (Taken 3/15/2024 1531)  Trust Relationship/Rapport:   care explained   choices provided   emotional support provided   empathic listening provided   questions encouraged  Social Functional Ability Promotion: self-expression encouraged

## 2024-03-15 NOTE — GROUP NOTE
"Education Group      Group Focus: Promoting Healthy Lifestyles      Number of patients in attendance: 1    Group Start Time: 0900  Group End Time:  0945  Groups Date: 3/15/2024  Group Topic:  Behavioral Health  Group Department: [facility]  Group Facilitators:  Michaela Dorsey LPN  _____________________________________________________________________    Patient Name: Felicita Victoria  MRN: [id number]  Patient Class: IP- Psych   Admission Date\Time: 3/13/2024  9:00 PM  Hospital Length of Stay: 2  Primary Care Provider: Cherrie, Primary Doctor     Referred by: Behavioral Medicine Unit Treatment Team     Target symptoms: Mood Disorder     Patient's response to treatment: Active Listening, Self-disclosure, and      Progress toward goals: Progressing adequately     Interval History: talkative. Verbalized understanding. " I want to stop smoking. I already had a doctor tell me the reason my stomach gives me so problems is cause I had smoke in there" I want to live healthier"      Diagnosis: bipolar     Plan: Continue treatment on BMU    "

## 2024-03-16 PROCEDURE — 25000003 PHARM REV CODE 250: Performed by: PSYCHIATRY & NEUROLOGY

## 2024-03-16 PROCEDURE — S4991 NICOTINE PATCH NONLEGEND: HCPCS | Performed by: PSYCHIATRY & NEUROLOGY

## 2024-03-16 PROCEDURE — 11400000 HC PSYCH PRIVATE ROOM

## 2024-03-16 RX ORDER — OXCARBAZEPINE 300 MG/1
300 TABLET, FILM COATED ORAL 2 TIMES DAILY
Status: DISCONTINUED | OUTPATIENT
Start: 2024-03-16 | End: 2024-03-20 | Stop reason: HOSPADM

## 2024-03-16 RX ORDER — OLANZAPINE 10 MG/1
10 TABLET, ORALLY DISINTEGRATING ORAL NIGHTLY
Status: DISCONTINUED | OUTPATIENT
Start: 2024-03-16 | End: 2024-03-19

## 2024-03-16 RX ORDER — LORAZEPAM 0.5 MG/1
0.5 TABLET ORAL 3 TIMES DAILY
Status: DISCONTINUED | OUTPATIENT
Start: 2024-03-16 | End: 2024-03-17

## 2024-03-16 RX ADMIN — OXCARBAZEPINE 300 MG: 300 TABLET, FILM COATED ORAL at 08:03

## 2024-03-16 RX ADMIN — LORAZEPAM 1 MG: 1 TABLET ORAL at 02:03

## 2024-03-16 RX ADMIN — LORAZEPAM 0.5 MG: 0.5 TABLET ORAL at 08:03

## 2024-03-16 RX ADMIN — OLANZAPINE 10 MG: 10 TABLET, ORALLY DISINTEGRATING ORAL at 08:03

## 2024-03-16 RX ADMIN — LORAZEPAM 1 MG: 1 TABLET ORAL at 08:03

## 2024-03-16 RX ADMIN — OXCARBAZEPINE 150 MG: 150 TABLET, FILM COATED ORAL at 08:03

## 2024-03-16 RX ADMIN — NICOTINE 1 PATCH: 14 PATCH, EXTENDED RELEASE TRANSDERMAL at 08:03

## 2024-03-16 NOTE — GROUP NOTE
Education Group      Group Focus: Communication Skills      Number of patients in attendance: 3    Group Start Time: 0915  Group End Time:  1000  Groups Date: 3/16/2024  Group Topic:  Behavioral Health  Group Department: [facility]  Group Facilitators:  Michaela Dorsey LPN  _____________________________________________________________________    Patient Name: Felicita Victoria  MRN: [id number]  Patient Class: IP- Psych   Admission Date\Time: 3/13/2024  9:00 PM  Hospital Length of Stay: 3  Primary Care Provider: Cherrie Primary Doctor     Referred by: Behavioral Medicine Unit Treatment Team     Target symptoms: Mood Disorder     Patient's response to treatment: Active Listening, Self-disclosure, and Feedback given to another patient     Progress toward goals: Progressing adequately     Interval History: Verbalized understanding. Good participation .     Diagnosis: Bipolar     Plan: Continue treatment on BMU

## 2024-03-16 NOTE — PROGRESS NOTES
Hospital day 3.     33-year-old white female who at this time continues to be significantly paranoid and guarded.  She was difficulties in terms of maintaining eye contact during a telemedicine session.    Telemedicine today.  One voice video synchronized platform.  Provider was Neo.  Patient was in [facility].    On interview process today patient was difficulty in terms maintaining eye contact with this provider at this time.  Staff report they has been witnessed respond to internal stimuli however when interviewed at this time she denies.  Certainly most predominant presentation in terms of overall interview at this time was level of flattening affect.  Mood look away and appeared to be grossly paranoid.      Patient describes no complaints with current medication trials     On mental status examination:  33-year-old white female whose affect is used to be inappropriate somewhat bizarre.  He was very flat.  She does not establish any eye contact with this provider.  Her speech was with good articulation execution when produced but produced softly he was difficult to understand.  Her thought content showed persistent paranoia.  She appeared to be misinterpreting events around her.  She was denied intentions to harm self or others.  Insight and judgment deemed to be quite poor.      Impression:   Bipolar affective disorder most recent episode mixed  with psychotic features   Nonadherence to medication treatment   Estimated continued hospitalization greater than 96 days     Indications:  Patient was time presents evidence of marked flattening affect continued persistent delusional beliefs with the hypervigilance and paranoia such at this time could not safely maintained at a lower level care     Recommendations:  1. We will increase doses Zyprexa 10 mg p.o. q.h.s..  2. We will increase doses Trileptal to 3 mg p.o. b.i.d..    3. We will cut doses of Ativan to 0.5 mg p.o. q.i.d..  Ativan originally has  been utilized to help with the overall symptoms of kindling of illness.    4.  We will need to staff again next week with team have overall discussions about appropriateness final disposition consideration

## 2024-03-16 NOTE — NURSING
"Daily Nursing Note:      Behavior:    Patient (Felicita Victoria is a 33 y.o. female, : 1990, MRN: [id number]) demonstrating an affect that was flat. Felicita demonstrating mood that is depressed and anxious. Felicita had an appearance that was clean and disheveled. Felicita denies suicidal ideation. Felicita denies suicide plan. Felicita denies homicidal ideation. Felicita denies hallucinations.    Felicita's  height is 5' 4" (1.626 m) and weight is 40.2 kg (88 lb 9.6 oz). Her temperature is 98.1 °F (36.7 °C). Her blood pressure is 122/85 and her pulse is 104. Her respiration is 20 and oxygen saturation is 100%.     Jaimes last BM was noted on: 3/15/2024    Intervention:    Encourage Felicita to perform self-hygiene, grooming, and changing of clothing. Monitor Felicita's behavior and program compliance. Monitor Felicita for suicidal ideation, homicidal ideation, sleep disturbance, and hallucinations. Encourage Felicita to eat all portions of meals and assess for meal preferences. Monitor Felicita for intake and output to ensure hydration. Notify the Physician for any medication refusal and any change in patient condition.      Response:    Felicita verbalizes understand of unit process and procedures. Felicita has been compliant with medications.      Plan:     Continue to monitor per MD orders; maintain patient safety. Q15min safety checks.   "

## 2024-03-16 NOTE — NURSING
"Daily Nursing Note:      Behavior:    Patient (Felicita Victoria is a 33 y.o. female, : 1990, MRN: [id number]) demonstrating an affect that was flat and anxious. Felicita demonstrating mood that is anxious. Felicita had an appearance that was disheveled. Felicita denies suicidal ideation. Felicita denies suicide plan. Felicita denies homicidal ideation. Felicita endorses hallucinations.    Felicita's  height is 5' 4" (1.626 m) and weight is 40.2 kg (88 lb 9.6 oz). Her temperature is 98.2 °F (36.8 °C). Her blood pressure is 112/80 and her pulse is 119 (abnormal). Her respiration is 18 and oxygen saturation is 98%.     Felicita's last BM was noted on: _______      Intervention:    Encourage Felicita to perform self-hygiene, grooming, and changing of clothing. Monitor Felicita's behavior and program compliance. Monitor Felicita for suicidal ideation, homicidal ideation, sleep disturbance, and hallucinations. Encourage Felicita to eat all portions of meals and assess for meal preferences. Monitor Felicita for intake and output to ensure hydration. Notify the Physician/Physician Assistant/Advance Practice Registered Nurse (MD/PA/APRN) for any medication refusal and any change in patient condition.      Response:    Felicita verbalizes understand of unit process and procedures. Felicita reported medications ______.      Plan:     Continue to monitor per MD/PA/APRN orders; maintain patient safety.  "

## 2024-03-16 NOTE — PLAN OF CARE
Problem: Cognitive Impairment (Psychotic Signs/Symptoms)  Goal: Optimal Cognitive Function (Psychotic Signs/Symptoms)  Outcome: Ongoing, Progressing     Problem: Mood Impairment (Psychotic Signs/Symptoms)  Goal: Improved Mood Symptoms (Psychotic Signs/Symptoms)  Outcome: Ongoing, Progressing     Problem: Adult Behavioral Health Plan of Care  Goal: Patient-Specific Goal (Individualization)  Outcome: Ongoing, Progressing  Flowsheets (Taken 3/16/2024 1043)  Patient Personal Strengths:   expressive of emotions   expressive of needs   insight into illness/situation   medication/treatment adherence   motivated for recovery   motivated for treatment  Patient Vulnerabilities: history of unsuccessful treatment  Goal: Adheres to Safety Considerations for Self and Others  Outcome: Ongoing, Progressing  Flowsheets (Taken 3/16/2024 1043)  Adheres to Safety Considerations for Self and Others: making progress toward outcome  Intervention: Develop and Maintain Individualized Safety Plan  Flowsheets (Taken 3/16/2024 1043)  Safety Measures:   environmental rounds completed   safety rounds completed  Goal: Optimized Coping Skills in Response to Life Stressors  Outcome: Ongoing, Progressing  Goal: Develops/Participates in Therapeutic [place] to Support Successful Transition  Outcome: Ongoing, Progressing  Intervention: Foster Therapeutic [place]  Flowsheets (Taken 3/16/2024 1043)  Trust Relationship/Rapport:   care explained   choices provided   emotional support provided   empathic listening provided   questions answered   questions encouraged   reassurance provided   thoughts/feelings acknowledged  Intervention: Mutually Develop Transition Plan  Flowsheets (Taken 3/16/2024 1043)  Current Discharge Risk: psychiatric illness  Outpatient/Agency/Support Group Needs:   outpatient counseling   outpatient medication management   outpatient psychiatric care (specify)  Anticipated Discharge Disposition: home or self-care  Patient/Family  Anticipated Services at Transition: mental health services  Patient/Family Anticipates Transition to: home  Concerns to be Addressed: mental health  Goal: Rounds/Family Conference  Outcome: Ongoing, Progressing     Problem: Violence Risk or Actual  Goal: Anger and Impulse Control  Outcome: Met     Problem: Behavior Regulation Impairment (Psychotic Signs/Symptoms)  Goal: Improved Behavioral Control (Psychotic Signs/Symptoms)  Outcome: Met     Problem: Decreased Participation and Engagement (Psychotic Signs/Symptoms)  Goal: Increased Participation and Engagement (Psychotic Signs/Symptoms)  Outcome: Met     Problem: Psychomotor Impairment (Psychotic Signs/Symptoms)  Goal: Improved Psychomotor Symptoms (Psychotic Signs/Symptoms)  Outcome: Met     Problem: Sensory Perception Impairment (Psychotic Signs/Symptoms)  Goal: Decreased Sensory Symptoms (Psychotic Signs/Symptoms)  Outcome: Met     Problem: Sleep Disturbance (Psychotic Signs/Symptoms)  Goal: Improved Sleep (Psychotic Signs/Symptoms)  Outcome: Met     Problem: Social, Occupational or Functional Impairment (Psychotic Signs/Symptoms)  Goal: Enhanced Social, Occupational or Functional Skills (Psychotic Signs/Symptoms)  Outcome: Met     Problem: Adult Behavioral Health Plan of Care  Goal: Plan of Care Review  Flowsheets (Taken 3/16/2024 1043)  Plan of Care Reviewed With: patient  Patient Agreement with Plan of Care: agrees

## 2024-03-17 PROCEDURE — 11400000 HC PSYCH PRIVATE ROOM

## 2024-03-17 PROCEDURE — S4991 NICOTINE PATCH NONLEGEND: HCPCS | Performed by: PSYCHIATRY & NEUROLOGY

## 2024-03-17 PROCEDURE — 25000003 PHARM REV CODE 250: Performed by: PSYCHIATRY & NEUROLOGY

## 2024-03-17 RX ORDER — LORAZEPAM 0.5 MG/1
0.5 TABLET ORAL 2 TIMES DAILY
Status: DISCONTINUED | OUTPATIENT
Start: 2024-03-17 | End: 2024-03-18

## 2024-03-17 RX ADMIN — LORAZEPAM 0.5 MG: 0.5 TABLET ORAL at 08:03

## 2024-03-17 RX ADMIN — LORAZEPAM 0.5 MG: 0.5 TABLET ORAL at 02:03

## 2024-03-17 RX ADMIN — OLANZAPINE 10 MG: 10 TABLET, ORALLY DISINTEGRATING ORAL at 08:03

## 2024-03-17 RX ADMIN — OXCARBAZEPINE 300 MG: 300 TABLET, FILM COATED ORAL at 08:03

## 2024-03-17 RX ADMIN — NICOTINE 1 PATCH: 14 PATCH, EXTENDED RELEASE TRANSDERMAL at 08:03

## 2024-03-17 NOTE — GROUP NOTE
Group Psychotherapy       Group Focus: Promoting Healthy Lifestyles      Number of patients in attendance: 7    Group Start Time: 1530  Group End Time:  1615  Groups Date: 3/17/2024  Group Topic:  Behavioral Health  Group Department: [facility]  Group Facilitators:  Slime Iverson RN  _____________________________________________________________________    Patient Name: Felicita Victoria  MRN: [id number]  Patient Class: IP- Psych   Admission Date\Time: 3/13/2024  9:00 PM  Hospital Length of Stay: 4  Primary Care Provider: Cherrie, Primary Doctor     Referred by: Behavioral Medicine Unit Treatment Team     Target symptoms: Mood Disorder and Psychosis     Patient's response to treatment: Active Listening     Progress toward goals: Progressing adequately     Interval History: attended and participated in group     Diagnosis: Bipolar 1, mixed, severe     Plan: Continue treatment on BMU

## 2024-03-17 NOTE — GROUP NOTE
Education Group       Group Focus: Promoting Healthy Lifestyles      Number of patients in attendance: 3    Group Start Time: 1015  Group End Time:  1100  Groups Date: 3/17/2024  Group Topic:  Behavioral Health  Group Department: [facility]  Group Facilitators:  Michalea Dorsey LPN  _____________________________________________________________________    Patient Name: Felicita Victoria  MRN: [id number]  Patient Class: IP- Psych   Admission Date\Time: 3/13/2024  9:00 PM  Hospital Length of Stay: 4  Primary Care Provider: Cherrie, Primary Doctor     Referred by: Behavioral Medicine Unit Treatment Team     Target symptoms: Mood Disorder     Patient's response to treatment: Active Listening, Self-disclosure, and Feedback given to another patient     Progress toward goals: Progressing adequately     Interval History: Good participation . Verbalized understanding.     Diagnosis: bipolar     Plan: Continue treatment on BMU

## 2024-03-17 NOTE — NURSING
"Daily Nursing Note:      Behavior:    Patient (Felicita Victoria is a 33 y.o. female, : 1990, MRN: [id number]) demonstrating an affect that was flat. Felicita demonstrating mood that is depressed and anxious. Felicita had an appearance that was clean . Felicita denies suicidal ideation. Felicita denies suicide plan. Felicita denies homicidal ideation. Felicita denies hallucinations.    Felicita's  height is 5' 4" (1.626 m) and weight is 41.5 kg (91 lb 9.6 oz). Her temperature is 98 °F (36.7 °C). Her blood pressure is 105/72 and her pulse is 118 (abnormal). Her respiration is 18 and oxygen saturation is 98%.     Felicita's last BM was noted on:3/16/2024.      Intervention:    Encourage Felicita to perform self-hygiene, grooming, and changing of clothing. Monitor Felicita's behavior and program compliance. Monitor Felicita for suicidal ideation, homicidal ideation, sleep disturbance, and hallucinations. Encourage Felicita to eat all portions of meals and assess for meal preferences. Monitor Felicita for intake and output to ensure hydration. Notify the MD for any medication refusal and any change in patient condition.      Response:    Felicita verbalizes understand of unit process and procedures. Felicita is compliant with medications and reports no adverse effects. She is present on the unit during the daytime.       Plan:     Continue to monitor per MD/PA/APRN orders; maintain patient safety. Q15min safety checks.  "

## 2024-03-17 NOTE — PLAN OF CARE
Problem: Cognitive Impairment (Psychotic Signs/Symptoms)  Goal: Optimal Cognitive Function (Psychotic Signs/Symptoms)  Outcome: Ongoing, Progressing     Problem: Mood Impairment (Psychotic Signs/Symptoms)  Goal: Improved Mood Symptoms (Psychotic Signs/Symptoms)  Outcome: Ongoing, Progressing  Intervention: Optimize Emotion and Mood  Flowsheets (Taken 3/17/2024 1024)  Diversional Activity: (playing cards)   television   other (see comments)     Problem: Adult Behavioral Health Plan of Care  Goal: Plan of Care Review  Outcome: Ongoing, Progressing  Flowsheets (Taken 3/17/2024 1024)  Plan of Care Reviewed With: patient  Patient Agreement with Plan of Care: agrees  Goal: Patient-Specific Goal (Individualization)  Outcome: Ongoing, Progressing  Flowsheets (Taken 3/17/2024 1024)  Patient Personal Strengths:   motivated for recovery   motivated for treatment   positive attitude  Goal: Adheres to Safety Considerations for Self and Others  Outcome: Ongoing, Progressing  Intervention: Develop and Maintain Individualized Safety Plan  Flowsheets (Taken 3/17/2024 1024)  Safety Measures:   environmental rounds completed   safety rounds completed  Goal: Optimized Coping Skills in Response to Life Stressors  Outcome: Ongoing, Progressing  Flowsheets (Taken 3/17/2024 1024)  Optimized Coping Skills in Response to Life Stressors: making progress toward outcome  Goal: Develops/Participates in Therapeutic [place] to Support Successful Transition  Outcome: Ongoing, Progressing  Intervention: Mutually Develop Transition Plan  Flowsheets (Taken 3/17/2024 1024)  Current Discharge Risk: psychiatric illness  Outpatient/Agency/Support Group Needs:   outpatient counseling   outpatient medication management   outpatient psychiatric care (specify)  Transition Support: follow-up care discussed  Anticipated Discharge Disposition: home or self-care  Patient/Family Anticipated Services at Transition: mental health services  Patient/Family  Anticipates Transition to: home  Concerns to be Addressed: mental health  Goal: Rounds/Family Conference  Outcome: Ongoing, Progressing

## 2024-03-17 NOTE — NURSING
"Daily Nursing Note:      Behavior:    Patient (Felicita Victoria is a 33 y.o. female, : 1990, MRN: [id number]) demonstrating an affect that was flat. Felicita demonstrating mood that is anxious. Felicita had an appearance that was disheveled. Felicita denies suicidal ideation. Felicita denies suicide plan. Felicita denies homicidal ideation. Felicita denies hallucinations.RTIS    Felicita's  height is 5' 4" (1.626 m) and weight is 40.2 kg (88 lb 9.6 oz). Her temperature is 98.1 °F (36.7 °C). Her blood pressure is 111/78 and her pulse is 111 (abnormal). Her respiration is 18 and oxygen saturation is 98%.       Intervention:    Encourage Felicita to perform self-hygiene, grooming, and changing of clothing. Monitor Felicita's behavior and program compliance. Monitor Felicita for suicidal ideation, homicidal ideation, sleep disturbance, and hallucinations. Encourage Felicita to eat all portions of meals and assess for meal preferences. Monitor Felicita for intake and output to ensure hydration. Notify the Physician/Physician Assistant/Advance Practice Registered Nurse (MD/PA/APRN) for any medication refusal and any change in patient condition.      Response:    Felicita verbalizes understand of unit process and procedures. Felicita is medication compliant.      Plan:     Continue to monitor per MD/PA/APRN orders; maintain patient safety.  "

## 2024-03-18 PROCEDURE — 11400000 HC PSYCH PRIVATE ROOM

## 2024-03-18 PROCEDURE — S4991 NICOTINE PATCH NONLEGEND: HCPCS | Performed by: PSYCHIATRY & NEUROLOGY

## 2024-03-18 PROCEDURE — 25000003 PHARM REV CODE 250: Performed by: PSYCHIATRY & NEUROLOGY

## 2024-03-18 RX ADMIN — OXCARBAZEPINE 300 MG: 300 TABLET, FILM COATED ORAL at 08:03

## 2024-03-18 RX ADMIN — LORAZEPAM 0.5 MG: 0.5 TABLET ORAL at 08:03

## 2024-03-18 RX ADMIN — OLANZAPINE 10 MG: 10 TABLET, ORALLY DISINTEGRATING ORAL at 08:03

## 2024-03-18 RX ADMIN — NICOTINE 1 PATCH: 14 PATCH, EXTENDED RELEASE TRANSDERMAL at 08:03

## 2024-03-18 NOTE — PLAN OF CARE
Problem: Cognitive Impairment (Psychotic Signs/Symptoms)  Goal: Optimal Cognitive Function (Psychotic Signs/Symptoms)  Outcome: Ongoing, Progressing  Intervention: Support and Promote Cognitive Ability  Flowsheets (Taken 3/18/2024 1815)  Trust Relationship/Rapport:   questions answered   questions encouraged   reassurance provided   thoughts/feelings acknowledged   empathic listening provided   emotional support provided     Problem: Adult Behavioral Health Plan of Care  Goal: Adheres to Safety Considerations for Self and Others  Outcome: Ongoing, Progressing  Intervention: Develop and Maintain Individualized Safety Plan  Flowsheets (Taken 3/18/2024 1815)  Safety Measures: suicide assessment completed  Goal: Optimized Coping Skills in Response to Life Stressors  Outcome: Ongoing, Progressing  Flowsheets (Taken 3/18/2024 1815)  Optimized Coping Skills in Response to Life Stressors: making progress toward outcome  Intervention: Promote Effective Coping Strategies  Flowsheets (Taken 3/18/2024 1815)  Supportive Measures:   positive reinforcement provided   decision-making supported   active listening utilized   problem-solving facilitated   relaxation techniques promoted   self-care encouraged   verbalization of feelings encouraged   self-responsibility promoted   self-reflection promoted     Problem: Mood Impairment (Psychotic Signs/Symptoms)  Goal: Improved Mood Symptoms (Psychotic Signs/Symptoms)  Flowsheets (Taken 3/18/2024 1815)  Mutually Determined Action Steps (Improved Mood Symptoms):   engages in physical activity   identifies personal treatment goal   identifies thought distortion   verbalizes increased insight  Intervention: Optimize Emotion and Mood  Flowsheets (Taken 3/18/2024 1815)  Supportive Measures:   positive reinforcement provided   decision-making supported   active listening utilized   problem-solving facilitated   relaxation techniques promoted   self-care encouraged   verbalization of feelings  encouraged   self-responsibility promoted   self-reflection promoted     Problem: Adult Behavioral Health Plan of Care  Goal: Patient-Specific Goal (Individualization)  Flowsheets (Taken 3/18/2024 1815)  Patient Personal Strengths: motivated for treatment

## 2024-03-18 NOTE — GROUP NOTE
Group Psychotherapy       Group Focus: discharge planning      Number of patients in attendance: 6    Group Start Time: 0900  Group End Time:  0945  Groups Date: 3/18/2024  Group Topic:  Behavioral Health  Group Department: [facility]  Group Facilitators:  Edgar De León LCSW  _____________________________________________________________________    Patient Name: Felicita Victoria  MRN: [id number]  Patient Class: IP- Psych   Admission Date\Time: 3/13/2024  9:00 PM  Hospital Length of Stay: 5  Primary Care Provider: Cherrie Primary Doctor     Referred by: Behavioral Medicine Unit Treatment Team     Target symptoms: Mood Disorder     Patient's response to treatment: Active Listening and Self-disclosure     Progress toward goals: Progressing slowly     Interval History: Group discussed the importance of realistic discharge planning and followup care.        Diagnosis:      Plan: Continue treatment on BMU

## 2024-03-18 NOTE — PROGRESS NOTES
Recreation Therapy Progress Note    Date:  03/18/2024    Time:  10:00 a.m.    Group Title:  Creative expression    Mood:  Patient is less withdrawn less anxious less guarded.  Patient's mood has improved    Behavior:  Patient is cooperative and hopeful and participated in group at 100%    Affect:  Patient affect is much brighter less guarded less depressed less anxious    Speech:  Patient talking more with staff and peers  Cognition:  Patient is alert and able to focused on cognitive game on problem-solving skills and self-esteem    Participation Level:  100%    Intervention:  Continue to motivate patient to attend recreational therapy groups and complete assignments related to treatment plan goals RT services          Recreation Therapist   Signature:  Deborah Basilio ma ctrs

## 2024-03-18 NOTE — NURSING
"Daily Nursing Note:      Behavior:    Patient (Felicita Victoria is a 33 y.o. female, : 1990, MRN: [id number]) demonstrating an affect that was sad and tearful. Felicita demonstrating mood that is depressed. Felicita had an appearance that was clean. Felicita denies suicidal ideation. Felicita denies suicide plan. Felicita denies homicidal ideation. Felicita denies hallucinations.    Felicita's  height is 5' 4" (1.626 m) and weight is 41.5 kg (91 lb 9.6 oz). Her temperature is 98.1 °F (36.7 °C). Her blood pressure is 117/77 and her pulse is 88. Her respiration is 18 and oxygen saturation is 99%.     Jaimes last BM was noted on: 3/18/24      Intervention:    Encourage Felicita to perform self-hygiene, grooming, and changing of clothing. Monitor Felicita's behavior and program compliance. Monitor Felicita for suicidal ideation, homicidal ideation, sleep disturbance, and hallucinations. Encourage Felicita to eat all portions of meals and assess for meal preferences. Monitor Felicita for intake and output to ensure hydration. Notify the Physician for any medication refusal and any change in patient condition.      Response:    Felicita verbalizes understand of unit process and procedures. Felicita reported medications are working well. In good spirits throughout the day and doing yoga and cleaning activity room. Journaling during the day. Insight into disorder becoming clear with goals facilitated. Performed education on disease process, medication management, followup, and coping skills. She just had an acute episode of paranoia with eyes darting side to side. She was angry and questioning how she got here and took several minutes to redirect until she became tearful, understanding, and complacent again.       Plan:     Continue to monitor per MD orders; maintain patient safety. Q15 minute safety check in progress  "

## 2024-03-18 NOTE — PROGRESS NOTES
RBUI# 4    33-year-old white female who at this time is showing a little bit more range affect today.  Patient today was seen in telemedicine platform.  Patient was seen while she was in [place] and provider is in [place].  Patient was seen on a telemedicine platform that is voice and video synchronized.      Felicita at this time on interview does show a little bit range affect little bit more spontaneity.  She did not appear to be quite as distressed or anxious overall.  She reports that this time no major concerns regards to her medication trials.  She states the increases in dose Trileptal as well as Zyprexa help.    Incongruent with the patient statements has been that staff reported they appear to be witnessing her to respond to internal stimuli.    Patient comply with the medication management.  Fortunately she was compliant.      On mental status examination:  33-year-old white female who shows a little bit more range of affect today.  Whose speech was with good articulation and execution.  His thought processes this time were linear could be tracked.  His thought content demonstrated no clear evidence of any auditory or visual hallucinations.  Patient was making no active statements to harm self.  Patient made no active statements to others.  She remains somewhat hypervigilant.  She denies auditory hallucinations me but others have witnessed insight and judgment deemed to be limited to poor.  Orientation was intact.    Impression:  Bipolar affective disorder most recent manic with psychotic features versus bipolar affective disorder most recent episode mixed with psychotic features     Indications:  Patient was time presents ongoing difficulties in terms of perceptions of mood and has not achieved adequate stabilization current medication trials     Estimated continued hospitalization 72-96 hours     Recommendations:  1. We will cut doses of Ativan to 0.5 mg p.o. b.i.d..    2. Continue trials of Zyprexa 10  mg p.o. q.h.s. has just been increased in the past 24 hours   3. Continue trials of Trileptal 300 mg p.o. b.i.d. as just been increased in the past 24 hours   4. We will proceed with a medication management reassess and re-evaluate.  We will ask  collect additional collateral information and presents to this provider.  Patient was will be interviewed in person tomorrow.

## 2024-03-18 NOTE — PROGRESS NOTES
Recreation Therapy Progress Note    Date:  03/18/2024    Time:  1400  Group Title:  Leisure skills    Mood:  Patient less anxious less withdrawn patient is participating more in group patient affect is brighter patient mood is brighter.    Behavior:  Patient is cooperative and staying in  group and  doing her best alert assignments.  Patient pleased art project that she completed today    Affect:  Patient has brought affect less depressed less anxious and interacting more    Speech:  Patient is talking more in group with staff and peers    Cognition:  Patient able to complete cognitive activity to her best making good choices step by step to complete assignment    Participation Level:  100% patient has improved    Intervention:  Continue RT services          Recreation Therapist   Signature:  Deborah Basilio MA CTRS

## 2024-03-18 NOTE — CARE UPDATE
Pt up and about.  Still has a flat, suspicious affect.  Pt is attending programming and participating and states that she feels much better now.  Pt states she is working toward discharge and getting back to her life.

## 2024-03-18 NOTE — PROGRESS NOTES
RUBI # 5    33-year-old white female who at this time shows evidence of in terms of affect.  She was able to smile more readily.  Today he no evidence of psychotic process.  Her thoughts were far better organized, and she was far more spontaneous.      Patient this time does has a history of previously failure to comply with medications outpatient basis.  When she was done so she was readily decompensated require higher level of services.      Patient was able to identify that is more likely than not that she was bipolar.  She accepts these interpretations.  Made it quite clear to her that she needs to make every effort possible to try to comply with medications moving forward she psychosis during the course of the assessment.      Patient was comply with medications and appears to be tolerating elimination trials of Ativan well at this time.  She appears to be a little bit disappointed that she will not be immediately discharged with anticipate will be able to move to outpatient care with the next 48 hours.  He will be imperative that she have aftercare set up with mental health prescriber or therapist post discharge.      On mental status examination:  33-year-old white female whose affect this time those with more appropriate range.  Whose speech was good articulation and execution.  His thought processes this time were able to be tracked and consider linear.  His thought content demonstrated no clear evidence of any visual hallucinations.  She denied any auditory hallucinations.  She made no statements to harm self.  No statements to harm others.  Her insight and judgment this time deemed to be limited.  Orientation was intact.      Impression:  Bipolar affective disorder most recent mixed with psychotic features   Nonadherence to medication treatment     Estimated stay 24-48 hours     Indications:  Patient was not need coordination of services outpatient level of care.  Patient was elimination of trials of  benzodiazepines as we move towards discharge.    Recommendations:  Patient was educated on current utilization of trials of Trileptal and Zyprexa.  Patient was particularly educated in terms overall importance of adherence to medications importance of follow up with aftercare.  Patient was appears to be motivated to do so.    Hopes were that patient was successfully be discharged in the next 48 hours.  Patient was evidence of marked improvement.

## 2024-03-18 NOTE — NURSING
"Daily Nursing Note:      Behavior:    Patient (Felicita Victoria is a 33 y.o. female, : 1990, MRN: [id number]) demonstrating an affect that was flat. Felicita demonstrating mood that is depressed and anxious. Felicita had an appearance that was clean. Felicita denies suicidal ideation. Felicita denies suicide plan. Felicita denies homicidal ideation. Felicita denies hallucinations.    Felicita's  height is 5' 4" (1.626 m) and weight is 41.5 kg (91 lb 9.6 oz). Her temperature is 97.8 °F (36.6 °C). Her blood pressure is 102/72 and her pulse is 111 (abnormal). Her respiration is 18 and oxygen saturation is 99%.     Jaimes last BM was noted on:3/17/24 _______      Intervention:    Encourage Felicita to perform self-hygiene, grooming, and changing of clothing. Monitor Felicita's behavior and program compliance. Monitor Felicita for suicidal ideation, homicidal ideation, sleep disturbance, and hallucinations. Encourage Felicita to eat all portions of meals and assess for meal preferences. Monitor Felicita for intake and output to ensure hydration. Notify the Physician/Physician Assistant/Advance Practice Registered Nurse (MD/PA/APRN) for any medication refusal and any change in patient condition.      Response:    Felicita verbalizes understand of unit process and procedures. Felicita is compliant with meds.      Plan:     Continue to monitor per MD/PA/APRN orders; maintain patient safety.  "

## 2024-03-18 NOTE — GROUP NOTE
Nursing Group      Group Focus: Stress Management and medication compliance; when to notice reportable symptoms; importance of follow-up; importance of not adjusting medications or weaning medications on your own     Number of patients in attendance: 9    Group Start Time: 1600  Group End Time:  1645  Groups Date: 3/18/2024  Group Topic:  Behavioral Health  Group Department: [facility]  Group Facilitators:  Danyelle Lopez RN  _____________________________________________________________________    Patient Name: Felicita Victoria  MRN: [id number]  Patient Class: IP- Psych   Admission Date\Time: 3/13/2024  9:00 PM  Hospital Length of Stay: 5  Primary Care Provider: Cherrie, Primary Doctor     Referred by: Behavioral Medicine Unit Treatment Team     Target symptoms: Depression, Anxiety, Mood Disorder, and Psychosis     Patient's response to treatment: Active Listening and Self-disclosure     Progress toward goals: Progressing adequately     Interval History: active participation in group with some guarding     Diagnosis: Bipolar mixed severe     Plan: Continue treatment on BMU

## 2024-03-19 PROCEDURE — 25000003 PHARM REV CODE 250: Performed by: PSYCHIATRY & NEUROLOGY

## 2024-03-19 PROCEDURE — S4991 NICOTINE PATCH NONLEGEND: HCPCS | Performed by: PSYCHIATRY & NEUROLOGY

## 2024-03-19 PROCEDURE — 11400000 HC PSYCH PRIVATE ROOM

## 2024-03-19 RX ORDER — OLANZAPINE 10 MG/1
10 TABLET ORAL NIGHTLY
Status: DISCONTINUED | OUTPATIENT
Start: 2024-03-19 | End: 2024-03-20 | Stop reason: HOSPADM

## 2024-03-19 RX ORDER — IBUPROFEN 200 MG
1 TABLET ORAL DAILY
Qty: 21 PATCH | Refills: 0 | Status: SHIPPED | OUTPATIENT
Start: 2024-03-20 | End: 2024-04-10

## 2024-03-19 RX ORDER — OXCARBAZEPINE 300 MG/1
300 TABLET, FILM COATED ORAL 2 TIMES DAILY
Qty: 60 TABLET | Refills: 1 | Status: ON HOLD | OUTPATIENT
Start: 2024-03-19 | End: 2024-04-02

## 2024-03-19 RX ORDER — OLANZAPINE 10 MG/1
10 TABLET ORAL NIGHTLY
Qty: 30 TABLET | Refills: 1 | Status: ON HOLD | OUTPATIENT
Start: 2024-03-19 | End: 2024-03-28

## 2024-03-19 RX ADMIN — OXCARBAZEPINE 300 MG: 300 TABLET, FILM COATED ORAL at 08:03

## 2024-03-19 RX ADMIN — OLANZAPINE 10 MG: 10 TABLET, FILM COATED ORAL at 08:03

## 2024-03-19 RX ADMIN — NICOTINE 1 PATCH: 14 PATCH, EXTENDED RELEASE TRANSDERMAL at 08:03

## 2024-03-19 NOTE — GROUP NOTE
Group Psychotherapy       Group Focus: Expressing Our Positive Feelings      Number of patients in attendance: 9    Group Start Time: 2000  Group End Time:  2030  Groups Date: 3/18/2024  Group Topic:  Behavioral Health  Group Department: [facility]  Group Facilitators:  Linda Gil RN  _____________________________________________________________________    Patient Name: Felicita Victoria  MRN: [id number]  Patient Class: IP- Psych   Admission Date\Time: 3/13/2024  9:00 PM  Hospital Length of Stay: 5  Primary Care Provider: Cherrie, Primary Doctor     Referred by: Behavioral Medicine Unit Treatment Team     Target symptoms: Depression     Patient's response to treatment: Active Listening,verbally participating.       Progress toward goals: Progressing slowly     Interval History: actively sharing past feelings and the joys she is expecting in her life     Diagnosis: Bipolar disorder     Plan: Continue treatment on BMU

## 2024-03-19 NOTE — PLAN OF CARE
Problem: Cognitive Impairment (Psychotic Signs/Symptoms)  Goal: Optimal Cognitive Function (Psychotic Signs/Symptoms)  Outcome: Ongoing, Progressing  Intervention: Support and Promote Cognitive Ability  Flowsheets (Taken 3/19/2024 1126)  Trust Relationship/Rapport:   care explained   choices provided   emotional support provided   empathic listening provided   questions answered   questions encouraged   reassurance provided   thoughts/feelings acknowledged     Problem: Mood Impairment (Psychotic Signs/Symptoms)  Goal: Improved Mood Symptoms (Psychotic Signs/Symptoms)  Outcome: Ongoing, Progressing  Flowsheets (Taken 3/19/2024 1126)  Mutually Determined Action Steps (Improved Mood Symptoms):   acknowledges progress   engages in physical activity   identifies personal treatment goal   identifies thought distortion   verbalizes increased insight  Intervention: Optimize Emotion and Mood  Flowsheets (Taken 3/19/2024 1126)  Supportive Measures:   active listening utilized   decision-making supported   goal-setting facilitated   journaling promoted   positive reinforcement provided   problem-solving facilitated   relaxation techniques promoted   self-care encouraged   self-reflection promoted   self-responsibility promoted   verbalization of feelings encouraged  Diversional Activity: journaling     Problem: Adult Behavioral Health Plan of Care  Goal: Plan of Care Review  Outcome: Ongoing, Progressing  Flowsheets (Taken 3/19/2024 1126)  Plan of Care Reviewed With: patient  Patient Agreement with Plan of Care: agrees  Goal: Patient-Specific Goal (Individualization)  Outcome: Ongoing, Progressing  Flowsheets (Taken 3/19/2024 1126)  Patient Personal Strengths:   motivated for treatment   motivated for recovery  Patient Vulnerabilities: occupational insecurity  Goal: Adheres to Safety Considerations for Self and Others  Outcome: Ongoing, Progressing  Intervention: Develop and Maintain Individualized Safety Plan  Flowsheets  (Taken 3/19/2024 1126)  Safety Measures: suicide assessment completed  Goal: Optimized Coping Skills in Response to Life Stressors  Outcome: Ongoing, Progressing  Intervention: Promote Effective Coping Strategies  Flowsheets (Taken 3/19/2024 1126)  Supportive Measures:   active listening utilized   decision-making supported   goal-setting facilitated   journaling promoted   positive reinforcement provided   problem-solving facilitated   relaxation techniques promoted   self-care encouraged   self-reflection promoted   self-responsibility promoted   verbalization of feelings encouraged  Goal: Develops/Participates in Therapeutic [place] to Support Successful Transition  Outcome: Ongoing, Progressing  Intervention: Foster Therapeutic [place]  Flowsheets (Taken 3/19/2024 1126)  Trust Relationship/Rapport:   care explained   choices provided   emotional support provided   empathic listening provided   questions answered   questions encouraged   reassurance provided   thoughts/feelings acknowledged

## 2024-03-19 NOTE — PROGRESS NOTES
Recreation Therapy Progress Note    Date:  03/19/2024    Time:  10:00 a.m.    Group Title:  Creative expression    Mood:  Patient's mood is much brighter less depressed less anxious    Behavior:  patient participating much more in group patient has improved social skills    Affect:  Patient affect is brighter patient eye  contact his much better    Speech:  Patient talking more in group with staff and peers    Cognition:  Patient able to focused on cognitive activity at 100% in group    Participation Level:  100%    Intervention:  Continue RT services          Recreation Therapist   Signature:  Deborah Basilio MA CTRS

## 2024-03-19 NOTE — GROUP NOTE
Group Psychotherapy       Group Focus: Psychodynamic Group Psychotherapy      Number of patients in attendance: 3    Group Start Time: 0900  Group End Time:  0945  Groups Date: 3/19/2024  Group Topic:  Behavioral Health  Group Department: [facility]  Group Facilitators:  Edgar De León LCSW  _____________________________________________________________________    Patient Name: Felicita Victoria  MRN: [id number]  Patient Class: IP- Psych   Admission Date\Time: 3/13/2024  9:00 PM  Hospital Length of Stay: 6  Primary Care Provider: Cherrie, Primary Doctor     Referred by: Behavioral Medicine Unit Treatment Team     Target symptoms: Mood Disorder     Patient's response to treatment: Active Listening and Self-disclosure     Progress toward goals: Progressing adequately     Interval History: Pt active in group.  Talking about needing to do the right thing in life.     Diagnosis:      Plan: Continue treatment on BMU

## 2024-03-19 NOTE — PROGRESS NOTES
RUBI # 6    33-year-old white female whose affect this time presents ongoing constriction of affect.  Today she considers evidence of which she appears to be just not to be a spontaneous as she was yesterday.  She was denying any active statements self injure.  She appeared to have some mild paranoia session but I am not convinced he was remaining here setting.      Reiterated at this time the patient was overall importance of continued compliance with medications and need to comply with medications moving forward.    Patient this time is anticipating being able to go to a lower level of care we will go ahead look towards disposition next 24 hours.  Important that  sure that patient was aftercare follow up with mental health prescriber post discharge.      On mental status examination evaluation:  33-year-old white female whose affect at this time was constricted.  Whose speech was with good articulation and execution although produced slowly.  His thought content demonstrated no clear evidence of any visual hallucinations.  There was no clear evidence of any auditory hallucinations.  Patient was making no active statements to harm self.  Patient made no active statements harm others.  Insight and judgment this time deemed to be limited.  Orientation was intact.      Impression:  Bipolar affective disorder most recent episode mixed with psychotic features     Indications patient this time appears to be reactive showing enough stabilization where she was transitioned to a lower care     Estimated continued stay:  24 hours     Recommendations:  1. We will continue with currently individuals medications including Zyprexa 10 mg p.o. q.h.s.   2. Continue trials of Trileptal 300 mg p.o. b.i.d..    3. We will continue to follow up with aftercare at this point.  Adjustments were made at this time.

## 2024-03-19 NOTE — NURSING
"Daily Nursing Note:      Behavior:    Patient (Felicita Victoria is a 33 y.o. female, : 1990, MRN: [id number]) demonstrating an affect that was anxious. Felicita demonstrating mood that is anxious. Felicita had an appearance that was clean. Felicita denies suicidal ideation. Felicita denies suicide plan. Felicita denies homicidal ideation. Felicita denies hallucinations.    Felicita's  height is 5' 4" (1.626 m) and weight is 41.5 kg (91 lb 9.6 oz). Her temperature is 98.1 °F (36.7 °C). Her blood pressure is 117/77 and her pulse is 88. Her respiration is 18 and oxygen saturation is 99%.     Felicita's last BM was noted on: 2024.  States she still feels "a little bloated like constipation, but may be just the end of my period."  Verbalizes some abdominal "crampy-like pain 3/10."  Given some warm prune juice to which she drank only a few sips. Self-reports that she "lost it earlier to day and had an episode."  Then continued on by saying she is feeling good right now.        Intervention:    Encouraged Felicita to perform self-hygiene, grooming, and changing of clothing. Monitored Felicita's behavior and program compliance. Monitored Felicita for suicidal ideation, homicidal ideation, sleep disturbance, and hallucinations. Encouraged Felicita to eat all portions of meals and assessed for meal preferences. Monitored Felicita for intake and output to ensure hydration. Will notify the Physician for any medication refusal and any change in patient condition.      Response:    Felicita verbalizes understand of unit process and procedures. Felicita reported no medications issues.        Plan:     Continue to monitor per MD/PA/APRN orders; maintain patient safety.   "

## 2024-03-19 NOTE — PROGRESS NOTES
Recreation Therapy Progress Note    Date:  03/19/2024    Time:  1400    Group Title:  Leisure skills    Mood:  Patient mood is brighter less depressed and less flat    Behavior:  Patient cooperative and participated in group.  Patient has improved the social skills and interacting on the unit    Affect:  Patient is hopeful and less anxious and less withdrawn    Speech:  Patient is talking more in group with staff and peers with less prompts needed    Cognition:  Patient able to focused on cognitive game    Participation Level:  100%    Intervention:  Continue RT services          Recreation Therapist   Signature:  Deborah BARRY

## 2024-03-19 NOTE — NURSING
"Daily Nursing Note:      Behavior:    Patient (Felicita Victoria is a 33 y.o. female, : 1990, MRN: [id number]) demonstrating an affect that was flat. Felicita demonstrating mood that is depressed and anxious. Felicita had an appearance that was clean. Felicita denies suicidal ideation. Felicita denies suicide plan. Felicita denies homicidal ideation. Felicita denies hallucinations.    Felicita's  height is 5' 4" (1.626 m) and weight is 41.5 kg (91 lb 9.6 oz). Her temperature is 97.6 °F (36.4 °C). Her blood pressure is 133/91 (abnormal) and her pulse is 106. Her respiration is 16 and oxygen saturation is 99%.     Felicita's last BM was noted on: 3/18/24      Intervention:    Encourage Felicita to perform self-hygiene, grooming, and changing of clothing. Monitor Felicita's behavior and program compliance. Monitor Felicita for suicidal ideation, homicidal ideation, sleep disturbance, and hallucinations. Encourage Felicita to eat all portions of meals and assess for meal preferences. Monitor Felicita for intake and output to ensure hydration. Notify the Physician for any medication refusal and any change in patient condition.      Response:    Felicita verbalizes understand of unit process and procedures. Felicita reported medications are helping her and has remained compliant with medications and plan of care. States she slept well and rates mood and anxiety 1/10. She is more flat today than yesterday and is doing a lot of self reflection and journaling. She pulled myself and Cheri aside in the conference room today and wanted to share some of her journaling and thoughts with us. She was very open and insightful and is starting to goal set. She did require redirection when she began crying and feeling hopeless with the thought of not being able to adequately support herself and her child. We discussed setting a few short term goals such as getting her medicaid back so she can follow-up on her mental health; " getting services to assist her as a single mother such as foodstamps or welfare. Educated on medications, coping skills, negative distortions, meditation and journaling. Patient receptive and thankful. Will notify the MD when he rounds of the patients episode documented yesterday as well as the somber and more depressed appearance seen today.      Plan:     Continue to monitor per MD orders; maintain patient safety. Q 15 minute safety checks in progress

## 2024-03-19 NOTE — PLAN OF CARE
Problem: Cognitive Impairment (Psychotic Signs/Symptoms)  Goal: Optimal Cognitive Function (Psychotic Signs/Symptoms)  Outcome: Ongoing, Progressing  Intervention: Support and Promote Cognitive Ability  Flowsheets (Taken 3/18/2024 2304)  Trust Relationship/Rapport:   care explained   choices provided   emotional support provided   empathic listening provided   questions answered   questions encouraged   thoughts/feelings acknowledged   reassurance provided     Problem: Mood Impairment (Psychotic Signs/Symptoms)  Goal: Improved Mood Symptoms (Psychotic Signs/Symptoms)  Outcome: Ongoing, Progressing  Intervention: Optimize Emotion and Mood  Flowsheets (Taken 3/18/2024 2304)  Supportive Measures:   active listening utilized   goal-setting facilitated   positive reinforcement provided   relaxation techniques promoted   self-responsibility promoted   verbalization of feelings encouraged   self-care encouraged   self-reflection promoted   problem-solving facilitated   decision-making supported   counseling provided     Problem: Adult Behavioral Health Plan of Care  Goal: Adheres to Safety Considerations for Self and Others  Outcome: Ongoing, Progressing  Intervention: Develop and Maintain Individualized Safety Plan  Flowsheets (Taken 3/18/2024 2304)  Safety Measures:   safety rounds completed   suicide assessment completed  Goal: Optimized Coping Skills in Response to Life Stressors  Outcome: Ongoing, Progressing  Intervention: Promote Effective Coping Strategies  Flowsheets (Taken 3/18/2024 2304)  Supportive Measures:   active listening utilized   goal-setting facilitated   positive reinforcement provided   relaxation techniques promoted   self-responsibility promoted   verbalization of feelings encouraged   self-care encouraged   self-reflection promoted   problem-solving facilitated   decision-making supported   counseling provided  Goal: Develops/Participates in Therapeutic [place] to Support Successful  Transition  Outcome: Ongoing, Progressing  Intervention: Foster Therapeutic [place]  Flowsheets (Taken 3/18/2024 2177)  Trust Relationship/Rapport:   care explained   choices provided   emotional support provided   empathic listening provided   questions answered   questions encouraged   thoughts/feelings acknowledged   reassurance provided

## 2024-03-20 VITALS
TEMPERATURE: 98 F | BODY MASS INDEX: 15.64 KG/M2 | OXYGEN SATURATION: 98 % | RESPIRATION RATE: 18 BRPM | DIASTOLIC BLOOD PRESSURE: 85 MMHG | HEIGHT: 64 IN | HEART RATE: 118 BPM | WEIGHT: 91.63 LBS | SYSTOLIC BLOOD PRESSURE: 116 MMHG

## 2024-03-20 PROCEDURE — S4991 NICOTINE PATCH NONLEGEND: HCPCS | Performed by: PSYCHIATRY & NEUROLOGY

## 2024-03-20 PROCEDURE — 25000003 PHARM REV CODE 250: Performed by: PSYCHIATRY & NEUROLOGY

## 2024-03-20 RX ADMIN — OXCARBAZEPINE 300 MG: 300 TABLET, FILM COATED ORAL at 08:03

## 2024-03-20 RX ADMIN — NICOTINE 1 PATCH: 14 PATCH, EXTENDED RELEASE TRANSDERMAL at 08:03

## 2024-03-20 NOTE — NURSING
"Daily Nursing Note:      Behavior:    Patient (Felicita Victoria is a 33 y.o. female, : 1990, MRN: [id number]) demonstrating an affect that was flat and a bit anxious rating the anxiety 3/10.  Denies any depression at this time.  Felicita demonstrating mood that is anxious. Felicita had an appearance that was good hygiene. Felicita denies suicidal ideation. Felicita denies suicide plan. Felicita denies homicidal ideation. Felicita denies hallucinations.    Felicita's  height is 5' 4" (1.626 m) and weight is 41.5 kg (91 lb 9.6 oz). Her temperature is 98.2 °F (36.8 °C). Her blood pressure is 130/74 and her pulse is 104. Her respiration is 16 and oxygen saturation is 99%. Felicita's last BM was noted on: 2024.    Felicita participated in group, read aloud to the group some of the excerpts in her journaling.  She states she is excited as well as anxious regarding her impending discharge in the a.m.  Felicita stayed throughout the group and encouraged her peers as they shared their thoughts and experiences.        Intervention:    Encouraged Felicita to perform self-hygiene, grooming, and changing of clothing. Monitored Felicita's behavior and program compliance. Monitored Felicita for suicidal ideation, homicidal ideation, sleep disturbance, and hallucinations. Encouraged Felicita to eat all portions of meals and assessed for meal preferences. Monitor Felicita for intake and output to ensure hydration. Notify the Physician for any medication refusal and any change in patient condition.      Response:    Felicita verbalizes understanding of unit process and procedures. Felicita reports no medication issues.      Plan:     Continue to monitor per MD orders; maintain patient safety with safety checks Q15 minutes and prn.   "

## 2024-03-20 NOTE — GROUP NOTE
Group Psychotherapy       Group Focus: Expressing Positive Feelings      Number of patients in attendance: 6    Continued group on positive feelings. One way to express positive feelings is to give compliments.  Group was challenged to incorporate giving compliments as part of their daily activities.   Giving compliments is a positive thing to do  It makes the other person feel better as well as the person giving the compliment.      Group Start Time: 2000  Group End Time:  2045  Groups Date: 03/19/2024  Group Topic:  Behavioral Health  Group Department: [facility]  Group Facilitators:  Linda Gil RN  _____________________________________________________________________    Patient Name: Felicita Victoria  MRN: [id number]  Patient Class: IP- Psych   Admission Date\Time: 3/13/2024  9:00 PM  Hospital Length of Stay: 7  Primary Care Provider: Cherrie, Primary Doctor     Referred by: Behavioral Medicine Unit Treatment Team     Target symptoms: Depression     Patient's response to treatment: Active Listening, Self-disclosure, Frequent Questions, and Feedback given to another patient     Progress toward goals: Progressing well     Interval History: Expressing Positive Feelings      Diagnosis: Bipolar Disorder      Plan: Continue treatment on BMU

## 2024-03-20 NOTE — PROGRESS NOTES
Discharge Note:    Felicita Victoria is a 33 y.o. female, : 1990, MRN: [id number], admitted on 3/13/2024 for Angel Kerns MD with a diagnosis of Psychosis [F29].    Patient discharged on 3/20/2024 per physician orders in stable condition. Patient denied suicidal ideation, homicidal ideation, or hallucinations. Patient was discharged with valuables, personal belongings, prescriptions, discharge instructions, and an educational handout explaining the diagnosis and prescribed medications. Patient verbalized understanding of the discharge instructions and importance of follow-up visits. Patient was escorted out of the facility by [facility] and placed into a private vehicle to be transported to home. Quiet but cooperative.     Patient discharged on the following medications:     Medication List        START taking these medications      nicotine 14 mg/24 hr  Commonly known as: NICODERM CQ  Place 1 patch onto the skin once daily. APPLY 1 PATCH DAILY AND REMOVE WITH DAILY BATH AND REPLACE WITH NEW PATCH for 21 days     OLANZapine 10 MG tablet  Commonly known as: ZyPREXA  Take 1 tablet (10 mg total) by mouth every evening.     OXcarbazepine 300 MG Tab  Commonly known as: TRILEPTAL  Take 1 tablet (300 mg total) by mouth 2 (two) times daily.            STOP taking these medications      busPIRone 15 MG tablet  Commonly known as: BUSPAR     famotidine 20 MG tablet  Commonly known as: PEPCID               Where to Get Your Medications        These medications were sent to [id number] - [place] - [address] AT [facility] [address]  [address], [place] [address]      Phone: [phone]   nicotine 14 mg/24 hr  OLANZapine 10 MG tablet  OXcarbazepine 300 MG Tab

## 2024-03-20 NOTE — PLAN OF CARE
Problem: Adult Behavioral Health Plan of Care  Goal: Plan of Care Review  Outcome: Ongoing, Progressing  Goal: Patient-Specific Goal (Individualization)  Outcome: Ongoing, Progressing  Goal: Optimized Coping Skills in Response to Life Stressors  Outcome: Ongoing, Progressing  Intervention: Promote Effective Coping Strategies  Flowsheets (Taken 3/19/2024 2050)  Supportive Measures:   self-care encouraged   active listening utilized   positive reinforcement provided   goal-setting facilitated   self-responsibility promoted   relaxation techniques promoted   counseling provided   decision-making supported   problem-solving facilitated   journaling promoted   verbalization of feelings encouraged   self-reflection promoted  Goal: Rounds/Family Conference  Outcome: Ongoing, Progressing

## 2024-03-20 NOTE — CARE UPDATE
Spoke with [facility] and confirmed that they will make arrangements with pt for admission to program.
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
## 2024-03-21 NOTE — HOSPITAL COURSE
Date of discharge 03/20/2024    33-year-old white female who at this time presents to [facility] involuntarily with increased difficulty in terms of distortions perceptions, and evidence of marked disorganization of thinking.  Patient this time presented with evidence of increased impulsivity and elevation of mood.      Patient underwent full physical assessment social physical evaluation patient was found to have no acute changes physical findings.  Laboratory assessment she showed no evidence of acute abnormalities other than presence of cannabis in her urine.    Patient was full psychiatric assessment.  Patient this time has a history of difficulties in terms of distortions perceptions as well evidence of increased degrees of paranoia episodically.  Patient this time is recommended to be placed on trials of mood stabilizing agents and conjunction with atypical antipsychotics.  Patient was placed on trials of olanzapine augmented with trials of Trileptal.  During the course of hospitalization doses Trileptal were advanced to 300 mg p.o. b.i.d..  Patient was overall doses of Zyprexa were titrated upwards to 10 mg p.o. q.h.s. and patient did show excellent response to the combination of these agents.  She showed evidence of improvement of mood stabilization.  She showed evidence of reduction of degrees of paranoia.  She was shown evidence of improvement in terms of sleep organization of thoughts.      Patient had no complications or sedated.  Strong recommendations were made for patient was follow up with outpatient care.  Patient was educated overall dynamics of bipolar affective disorder importance of continued adherence with medications and follow up with mental health prescriber.  She appeared more than willing to do so.  Patient was no real complications overall course of treatment.  She remained confined to inpatient setting once stabilized in the low there appeared to be at risk to self-harm and showing  improvement in terms of mood and control of impulses and felt that she could safely return to outpatient care and follow-up with outpatient mental health services.    Patient was discharged to home on 03/20/2024 stable condition.  She was not suicidal.  She was not homicidal.  She had not show evidence of active psychosis.

## 2024-03-27 ENCOUNTER — HOSPITAL ENCOUNTER (EMERGENCY)
Facility: HOSPITAL | Age: 34
Discharge: PSYCHIATRIC OR SUBSTANCE USE FACILITY | End: 2024-03-27
Attending: FAMILY MEDICINE
Payer: MEDICAID

## 2024-03-27 ENCOUNTER — HOSPITAL ENCOUNTER (INPATIENT)
Facility: HOSPITAL | Age: 34
LOS: 7 days | Discharge: HOME | DRG: 885 | End: 2024-04-03
Attending: PSYCHIATRY & NEUROLOGY | Admitting: PSYCHIATRY & NEUROLOGY
Payer: MEDICAID

## 2024-03-27 VITALS
RESPIRATION RATE: 18 BRPM | SYSTOLIC BLOOD PRESSURE: 108 MMHG | HEART RATE: 65 BPM | BODY MASS INDEX: 15.75 KG/M2 | HEIGHT: 66 IN | TEMPERATURE: 98 F | WEIGHT: 98 LBS | DIASTOLIC BLOOD PRESSURE: 69 MMHG | OXYGEN SATURATION: 100 %

## 2024-03-27 DIAGNOSIS — R45.851 SUICIDAL IDEATION: Primary | ICD-10-CM

## 2024-03-27 DIAGNOSIS — F29 PSYCHOSIS: ICD-10-CM

## 2024-03-27 DIAGNOSIS — Z00.8 MEDICAL CLEARANCE FOR PSYCHIATRIC ADMISSION: ICD-10-CM

## 2024-03-27 LAB
ALBUMIN SERPL-MCNC: 4.1 G/DL (ref 3.5–5)
ALBUMIN/GLOB SERPL: 1.2 RATIO (ref 1.1–2)
ALP SERPL-CCNC: 63 UNIT/L (ref 40–150)
ALT SERPL-CCNC: 19 UNIT/L (ref 0–55)
AMPHET UR QL SCN: NEGATIVE
APAP SERPL-MCNC: <17.4 UG/ML (ref 17.4–30)
APPEARANCE UR: CLEAR
AST SERPL-CCNC: 18 UNIT/L (ref 5–34)
B-HCG SERPL QL: NEGATIVE
BACTERIA #/AREA URNS AUTO: ABNORMAL /HPF
BARBITURATE SCN PRESENT UR: NEGATIVE
BASOPHILS # BLD AUTO: 0.02 X10(3)/MCL
BASOPHILS NFR BLD AUTO: 0.2 %
BENZODIAZ UR QL SCN: NEGATIVE
BILIRUB SERPL-MCNC: 0.5 MG/DL
BILIRUB UR QL STRIP.AUTO: NEGATIVE
BUN SERPL-MCNC: 5.4 MG/DL (ref 7–18.7)
CALCIUM SERPL-MCNC: 10.1 MG/DL (ref 8.4–10.2)
CANNABINOIDS UR QL SCN: POSITIVE
CHLORIDE SERPL-SCNC: 103 MMOL/L (ref 98–107)
CO2 SERPL-SCNC: 25 MMOL/L (ref 22–29)
COCAINE UR QL SCN: NEGATIVE
COLOR UR AUTO: YELLOW
CREAT SERPL-MCNC: 0.7 MG/DL (ref 0.55–1.02)
EOSINOPHIL # BLD AUTO: 0.01 X10(3)/MCL (ref 0–0.9)
EOSINOPHIL NFR BLD AUTO: 0.1 %
ERYTHROCYTE [DISTWIDTH] IN BLOOD BY AUTOMATED COUNT: 12 % (ref 11.5–17)
ETHANOL SERPL-MCNC: <10 MG/DL
FLUAV AG UPPER RESP QL IA.RAPID: NOT DETECTED
FLUBV AG UPPER RESP QL IA.RAPID: NOT DETECTED
GFR SERPLBLD CREATININE-BSD FMLA CKD-EPI: >60 MLS/MIN/1.73/M2
GLOBULIN SER-MCNC: 3.3 GM/DL (ref 2.4–3.5)
GLUCOSE SERPL-MCNC: 104 MG/DL (ref 74–100)
GLUCOSE UR QL STRIP.AUTO: NEGATIVE
HCT VFR BLD AUTO: 43.8 % (ref 37–47)
HGB BLD-MCNC: 14.8 G/DL (ref 12–16)
IMM GRANULOCYTES # BLD AUTO: 0.01 X10(3)/MCL (ref 0–0.04)
IMM GRANULOCYTES NFR BLD AUTO: 0.1 %
KETONES UR QL STRIP.AUTO: NEGATIVE
LEUKOCYTE ESTERASE UR QL STRIP.AUTO: NEGATIVE
LYMPHOCYTES # BLD AUTO: 2.3 X10(3)/MCL (ref 0.6–4.6)
LYMPHOCYTES NFR BLD AUTO: 24.1 %
MCH RBC QN AUTO: 32 PG (ref 27–31)
MCHC RBC AUTO-ENTMCNC: 33.8 G/DL (ref 33–36)
MCV RBC AUTO: 94.8 FL (ref 80–94)
MONOCYTES # BLD AUTO: 0.39 X10(3)/MCL (ref 0.1–1.3)
MONOCYTES NFR BLD AUTO: 4.1 %
NEUTROPHILS # BLD AUTO: 6.81 X10(3)/MCL (ref 2.1–9.2)
NEUTROPHILS NFR BLD AUTO: 71.4 %
NITRITE UR QL STRIP.AUTO: NEGATIVE
OPIATES UR QL SCN: NEGATIVE
PCP UR QL: NEGATIVE
PH UR STRIP.AUTO: 7.5 [PH]
PH UR: 7.5 [PH] (ref 3–11)
PLATELET # BLD AUTO: 205 X10(3)/MCL (ref 130–400)
PMV BLD AUTO: 9.8 FL (ref 7.4–10.4)
POTASSIUM SERPL-SCNC: 3.8 MMOL/L (ref 3.5–5.1)
PROT SERPL-MCNC: 7.4 GM/DL (ref 6.4–8.3)
PROT UR QL STRIP.AUTO: NEGATIVE
RBC # BLD AUTO: 4.62 X10(6)/MCL (ref 4.2–5.4)
RBC #/AREA URNS AUTO: ABNORMAL /HPF
RBC UR QL AUTO: NEGATIVE
SARS-COV-2 RNA RESP QL NAA+PROBE: NOT DETECTED
SODIUM SERPL-SCNC: 137 MMOL/L (ref 136–145)
SP GR UR STRIP.AUTO: 1.01 (ref 1–1.03)
SPECIFIC GRAVITY, URINE AUTO (.000) (OHS): 1.01 (ref 1–1.03)
SQUAMOUS #/AREA URNS AUTO: ABNORMAL /HPF
TSH SERPL-ACNC: 0.65 UIU/ML (ref 0.35–4.94)
UROBILINOGEN UR STRIP-ACNC: 1
WBC # SPEC AUTO: 9.54 X10(3)/MCL (ref 4.5–11.5)
WBC #/AREA URNS AUTO: ABNORMAL /HPF

## 2024-03-27 PROCEDURE — 25000003 PHARM REV CODE 250: Performed by: FAMILY MEDICINE

## 2024-03-27 PROCEDURE — 85025 COMPLETE CBC W/AUTO DIFF WBC: CPT | Performed by: FAMILY MEDICINE

## 2024-03-27 PROCEDURE — 81025 URINE PREGNANCY TEST: CPT | Performed by: FAMILY MEDICINE

## 2024-03-27 PROCEDURE — 82077 ASSAY SPEC XCP UR&BREATH IA: CPT | Performed by: FAMILY MEDICINE

## 2024-03-27 PROCEDURE — 11400000 HC PSYCH PRIVATE ROOM

## 2024-03-27 PROCEDURE — 99285 EMERGENCY DEPT VISIT HI MDM: CPT

## 2024-03-27 PROCEDURE — 80053 COMPREHEN METABOLIC PANEL: CPT | Performed by: FAMILY MEDICINE

## 2024-03-27 PROCEDURE — 80143 DRUG ASSAY ACETAMINOPHEN: CPT | Performed by: FAMILY MEDICINE

## 2024-03-27 PROCEDURE — 81001 URINALYSIS AUTO W/SCOPE: CPT | Mod: XB | Performed by: FAMILY MEDICINE

## 2024-03-27 PROCEDURE — 80307 DRUG TEST PRSMV CHEM ANLYZR: CPT | Performed by: FAMILY MEDICINE

## 2024-03-27 PROCEDURE — 0240U COVID/FLU A&B PCR: CPT | Performed by: FAMILY MEDICINE

## 2024-03-27 PROCEDURE — 84443 ASSAY THYROID STIM HORMONE: CPT | Performed by: FAMILY MEDICINE

## 2024-03-27 RX ORDER — IBUPROFEN 400 MG/1
400 TABLET ORAL EVERY 6 HOURS PRN
Status: DISCONTINUED | OUTPATIENT
Start: 2024-03-27 | End: 2024-04-03 | Stop reason: HOSPADM

## 2024-03-27 RX ORDER — OLANZAPINE 10 MG/1
10 TABLET, ORALLY DISINTEGRATING ORAL EVERY 8 HOURS PRN
Status: DISCONTINUED | OUTPATIENT
Start: 2024-03-27 | End: 2024-04-03 | Stop reason: HOSPADM

## 2024-03-27 RX ORDER — ONDANSETRON 4 MG/1
4 TABLET, ORALLY DISINTEGRATING ORAL
Status: COMPLETED | OUTPATIENT
Start: 2024-03-27 | End: 2024-03-27

## 2024-03-27 RX ORDER — IBUPROFEN 200 MG
1 TABLET ORAL DAILY
Status: DISCONTINUED | OUTPATIENT
Start: 2024-03-28 | End: 2024-04-03 | Stop reason: HOSPADM

## 2024-03-27 RX ORDER — ACETAMINOPHEN 500 MG
1000 TABLET ORAL
Status: COMPLETED | OUTPATIENT
Start: 2024-03-27 | End: 2024-03-27

## 2024-03-27 RX ORDER — HYDROXYZINE PAMOATE 25 MG/1
50 CAPSULE ORAL EVERY 6 HOURS PRN
Status: DISCONTINUED | OUTPATIENT
Start: 2024-03-27 | End: 2024-04-03 | Stop reason: HOSPADM

## 2024-03-27 RX ORDER — ALUMINUM HYDROXIDE, MAGNESIUM HYDROXIDE, AND SIMETHICONE 1200; 120; 1200 MG/30ML; MG/30ML; MG/30ML
30 SUSPENSION ORAL EVERY 6 HOURS PRN
Status: DISCONTINUED | OUTPATIENT
Start: 2024-03-27 | End: 2024-04-03 | Stop reason: HOSPADM

## 2024-03-27 RX ADMIN — ONDANSETRON 4 MG: 4 TABLET, ORALLY DISINTEGRATING ORAL at 03:03

## 2024-03-27 RX ADMIN — ACETAMINOPHEN 1000 MG: 500 TABLET ORAL at 03:03

## 2024-03-27 NOTE — ED NOTES
[facility] here for pt pick-up; Dr Crook here with [facility]; no distress noted in pt upon leaving; calm and cooperative;

## 2024-03-27 NOTE — ED NOTES
Tech placed PT on bedside commode. Pt unable to urinate at this time.  Gave water. Will try again later.

## 2024-03-27 NOTE — ED NOTES
Pt states ok to give information/speak with her mother, Yandy Victoria; called mother and informed that pt is being transferred to [facility], verbalizes understanding and denies any other questions/concerns

## 2024-03-27 NOTE — ED PROVIDER NOTES
Encounter Date: 3/27/2024       History     Chief Complaint   Patient presents with    Psychiatric Evaluation     Pt brought in on OPC by [place] police, per OPC:  pt hx of bipolar disorder, non-med compliant. Depressed, paranoid- thinks everyone is out to get her, SI- threatned to hand herself. Grandmother Mery Varghese ( grandmother)     33-year-old with a diagnosis of bipolar disorder has been severely depressed not taking any of her medications some thinking threatened to hang herself told me she gravely disabled decreased appetite brought in by police on OPC patient quiet with saddle denies any other complaints vital signs are stable physical exam was unremarkable we will clear her for medical clearance and have her placed in psychiatric facility for evaluation        Review of patient's allergies indicates:   Allergen Reactions    Codeine Anaphylaxis and Rash    Hydrocodone-acetaminophen Other (See Comments) and Swelling     Facial and mouth swelling    Diphenhydramine-pseudoephed     Diphenhydramine hcl Itching and Rash     Past Medical History:   Diagnosis Date    Anxiety disorder, unspecified     Bipolar disorder, unspecified     Hallucination     History of psychiatric hospitalization     Hx of psychiatric care     Psychiatric problem     Psychosis     Sleep difficulties     Substance abuse     Withdrawal symptoms, alcohol      History reviewed. No pertinent surgical history.  Family History   Problem Relation Age of Onset    Suicide Mother     Bipolar disorder Mother     Suicide Sister     Depression Sister      Social History     Tobacco Use    Smoking status: Every Day     Types: Cigarettes    Smokeless tobacco: Never   Substance Use Topics    Drug use: Not Currently     Review of Systems   Constitutional:  Positive for appetite change and fatigue. Negative for fever.   HENT:  Negative for sore throat.    Respiratory:  Negative for shortness of breath.    Cardiovascular:  Negative for chest pain.    Gastrointestinal:  Negative for nausea.   Genitourinary:  Negative for dysuria.   Musculoskeletal:  Negative for back pain.   Skin:  Negative for rash.   Neurological:  Negative for weakness.   Hematological:  Does not bruise/bleed easily.   Psychiatric/Behavioral:  Positive for suicidal ideas.    All other systems reviewed and are negative.      Physical Exam     Initial Vitals [03/27/24 1222]   BP Pulse Resp Temp SpO2   118/76 104 16 98.9 °F (37.2 °C) 99 %      MAP       --         Physical Exam    Nursing note and vitals reviewed.  Constitutional: She is active.   Thin cachectic white female   HENT:   Head: Normocephalic and atraumatic.   Eyes: Conjunctivae, EOM and lids are normal. Pupils are equal, round, and reactive to light.   Neck: Trachea normal and phonation normal. Neck supple. No thyroid mass present.   Normal range of motion.  Cardiovascular:  Normal rate, regular rhythm, normal heart sounds and normal pulses.           Musculoskeletal:         General: Normal range of motion.      Cervical back: Normal range of motion and neck supple.     Neurological: She is alert and oriented to person, place, and time. GCS score is 15. GCS eye subscore is 4. GCS verbal subscore is 5. GCS motor subscore is 6.   Skin: Skin is warm and intact.   Psychiatric: Her speech is normal and behavior is normal. Judgment normal. Cognition and memory are normal.   Depressed mood flat affect suicidal ideations paranoia         ED Course   Procedures  Labs Reviewed   COMPREHENSIVE METABOLIC PANEL - Abnormal; Notable for the following components:       Result Value    Glucose Level 104 (*)     Blood Urea Nitrogen 5.4 (*)     All other components within normal limits   DRUG SCREEN, URINE (BEAKER) - Abnormal; Notable for the following components:    Cannabinoids, Urine Positive (*)     All other components within normal limits    Narrative:     Cut off concentrations:    Amphetamines - 1000 ng/ml  Barbiturates - 200  ng/ml  Benzodiazepine - 200 ng/ml  Cannabinoids (THC) - 50 ng/ml  Cocaine - 300 ng/ml  Fentanyl - 1.0 ng/ml  MDMA - 500 ng/ml  Opiates - 300 ng/ml   Phencyclidine (PCP) - 25 ng/ml    Specimen submitted for drug analysis and tested for pH and specific gravity in order to evaluate sample integrity. Suspect tampering if specific gravity is <1.003 and/or pH is not within the range of 4.5 - 8.0  False negatives may result form substances such as bleach added to urine.  False positives may result for the presence of a substance with similar chemical structure to the drug or its metabolite.    This test provides only a PRELIMINARY analytical test result. A more specific alternate chemical method must be used in order to obtain a confirmed analytical result. Gas chromatography/mass spectrometry (GC/MS) is the preferred confirmatory method. Other chemical confirmation methods are available. Clinical consideration and professional judgement should be applied to any drug of abuse test result, particularly when preliminary positive results are used.    Positive results will be confirmed only at the physicians request. Unconfirmed screening results are to be used only for medical purposes (treatment).        ACETAMINOPHEN LEVEL - Abnormal; Notable for the following components:    Acetaminophen Level <17.4 (*)     All other components within normal limits   CBC WITH DIFFERENTIAL - Abnormal; Notable for the following components:    MCV 94.8 (*)     MCH 32.0 (*)     All other components within normal limits   URINALYSIS, MICROSCOPIC - Abnormal; Notable for the following components:    Bacteria, UA Few (*)     Squamous Epithelial Cells, UA Many (*)     All other components within normal limits   TSH - Normal   URINALYSIS, REFLEX TO URINE CULTURE - Normal   ALCOHOL,MEDICAL (ETHANOL) - Normal   COVID/FLU A&B PCR - Normal    Narrative:     The Xpert Xpress SARS-CoV-2/FLU/RSV plus is a rapid, multiplexed real-time PCR test intended for  the simultaneous qualitative detection and differentiation of SARS-CoV-2, Influenza A, Influenza B, and respiratory syncytial virus (RSV) viral RNA in either nasopharyngeal swab or nasal swab specimens.         CBC W/ AUTO DIFFERENTIAL    Narrative:     The following orders were created for panel order CBC auto differential.  Procedure                               Abnormality         Status                     ---------                               -----------         ------                     CBC with Differential[[id number]]       Abnormal            Final result                 Please view results for these tests on the individual orders.          Imaging Results    None          Medications   acetaminophen tablet 1,000 mg (1,000 mg Oral Given 3/27/24 1509)   ondansetron disintegrating tablet 4 mg (4 mg Oral Given 3/27/24 1509)     Medical Decision Making  33-year-old with a diagnosis of bipolar disorder has been severely depressed not taking any of her medications some thinking threatened to hang herself told me she gravely disabled decreased appetite brought in by police on OPC patient quiet with saddle denies any other complaints vital signs are stable physical exam was unremarkable we will clear her for medical clearance and have her placed in psychiatric facility for evaluation      Patient is medically cleared for psychiatric placement    Amount and/or Complexity of Data Reviewed  Labs: ordered.    Risk  OTC drugs.  Prescription drug management.  Risk Details: Differential diagnosis depression suicidal ideations                  Medically cleared for psychiatry placement: 3/27/2024  1:33 PM                   Clinical Impression:  Final diagnoses:  [R45.851] Suicidal ideation (Primary)  [Z00.8] Medical clearance for psychiatric admission          ED Disposition Condition    Transfer to Psych Facility Stable          ED Prescriptions    None       Follow-up Information    None          Mendel Fiore,  MD  03/27/24 1544

## 2024-03-28 PROBLEM — F12.20 CANNABIS USE DISORDER, SEVERE, DEPENDENCE: Status: ACTIVE | Noted: 2024-03-28

## 2024-03-28 LAB
GLUCOSE P FAST SERPL-MCNC: 84 MG/DL (ref 70–100)
T PALLIDUM AB SER QL: NONREACTIVE

## 2024-03-28 PROCEDURE — 25000003 PHARM REV CODE 250: Performed by: PSYCHIATRY & NEUROLOGY

## 2024-03-28 PROCEDURE — 82947 ASSAY GLUCOSE BLOOD QUANT: CPT | Performed by: PSYCHIATRY & NEUROLOGY

## 2024-03-28 PROCEDURE — 86780 TREPONEMA PALLIDUM: CPT | Performed by: PSYCHIATRY & NEUROLOGY

## 2024-03-28 PROCEDURE — S4991 NICOTINE PATCH NONLEGEND: HCPCS | Performed by: PSYCHIATRY & NEUROLOGY

## 2024-03-28 PROCEDURE — 11400000 HC PSYCH PRIVATE ROOM

## 2024-03-28 RX ORDER — LORAZEPAM 0.5 MG/1
1 TABLET ORAL 3 TIMES DAILY
Status: DISCONTINUED | OUTPATIENT
Start: 2024-03-28 | End: 2024-04-01

## 2024-03-28 RX ORDER — OXCARBAZEPINE 300 MG/1
300 TABLET, FILM COATED ORAL 2 TIMES DAILY
Status: DISCONTINUED | OUTPATIENT
Start: 2024-03-28 | End: 2024-04-03 | Stop reason: HOSPADM

## 2024-03-28 RX ADMIN — HYDROXYZINE PAMOATE 50 MG: 25 CAPSULE ORAL at 08:03

## 2024-03-28 RX ADMIN — NICOTINE 1 PATCH: 21 PATCH, EXTENDED RELEASE TRANSDERMAL at 08:03

## 2024-03-28 RX ADMIN — OLANZAPINE 10 MG: 10 TABLET, ORALLY DISINTEGRATING ORAL at 08:03

## 2024-03-28 NOTE — PROGRESS NOTES
Recreation Therapy Progress Note    Date:  03/28/2024    Time:  10:00 a.m.  Group Title:  Creative expression    Mood:  Very anxious depressed withdrawn and guarded    Behavior:  Patient attended group and participated very minimal.  Due to patient is very guarded and confused    Affect:  Patient very confused anxious and guarded.  Patient states she does not know why she is in the hospital    Speech:  Normal.  But talking very limited    Cognition:  Patient very confused.  Participation Level:  Patient attended group and participated minimal level with staff motivation    Intervention:  Continue RT services.  Assist patient one-to-one as needed to reach her goals.  Patient needs one-to-one at this time continue to assist one-to-one          Recreation Therapist   Signature:  Deborah Basilio ma ctrs

## 2024-03-28 NOTE — PLAN OF CARE
Problem: Violence Risk or Actual  Goal: Anger and Impulse Control  Outcome: Ongoing, Not Progressing  Intervention: Minimize Safety Risk  Flowsheets (Taken 3/27/2024 1950)  Behavior Management:   boundaries reinforced   impulse control promoted  Sensory Stimulation Regulation: quiet environment promoted     Problem: Fall Injury Risk  Goal: Absence of Fall and Fall-Related Injury  Outcome: Ongoing, Not Progressing     Problem: Adult Behavioral Health Plan of Care  Goal: Plan of Care Review  Outcome: Ongoing, Not Progressing  Goal: Patient-Specific Goal (Individualization)  Outcome: Ongoing, Not Progressing  Goal: Adheres to Safety Considerations for Self and Others  Outcome: Ongoing, Not Progressing  Intervention: Develop and Maintain Individualized Safety Plan  Flowsheets (Taken 3/27/2024 1950)  Safety Measures:   suicide assessment completed   environmental rounds completed  Goal: Absence of New-Onset Illness or Injury  Outcome: Ongoing, Not Progressing  Intervention: Identify and Manage Fall Risk  Flowsheets (Taken 3/27/2024 1950)  Safety Measures:   suicide assessment completed   environmental rounds completed  Intervention: Prevent Infection  Flowsheets (Taken 3/27/2024 1950)  Infection Prevention: hand hygiene promoted  Goal: Optimized Coping Skills in Response to Life Stressors  Outcome: Ongoing, Not Progressing  Intervention: Promote Effective Coping Strategies  Flowsheets (Taken 3/27/2024 1950)  Supportive Measures:   active listening utilized   counseling provided   positive reinforcement provided   self-responsibility promoted   problem-solving facilitated   verbalization of feelings encouraged   relaxation techniques promoted   self-care encouraged   self-reflection promoted  Goal: Develops/Participates in Therapeutic [place] to Support Successful Transition  Outcome: Ongoing, Not Progressing  Intervention: Mutually Develop Transition Plan  Flowsheets (Taken 3/27/2024 1950)  Concerns to be Addressed:    mental health   cognitive/perceptual  Goal: Rounds/Family Conference  Outcome: Ongoing, Not Progressing     Problem: Behavior Regulation Impairment (Psychotic Signs/Symptoms)  Goal: Improved Behavioral Control (Psychotic Signs/Symptoms)  Outcome: Ongoing, Not Progressing  Intervention: Manage Behavior  Flowsheets (Taken 3/27/2024 1950)  De-Escalation Techniques:   medication offered   appropriate behavior reinforced     Problem: Cognitive Impairment (Psychotic Signs/Symptoms)  Goal: Optimal Cognitive Function (Psychotic Signs/Symptoms)  Outcome: Ongoing, Not Progressing     Problem: Decreased Participation and Engagement (Psychotic Signs/Symptoms)  Goal: Increased Participation and Engagement (Psychotic Signs/Symptoms)  Outcome: Ongoing, Not Progressing  Intervention: Facilitate Participation and Engagement  Flowsheets (Taken 3/27/2024 2223)  Supportive Measures:   active listening utilized   positive reinforcement provided   self-responsibility promoted   problem-solving facilitated   verbalization of feelings encouraged   relaxation techniques promoted   self-care encouraged   self-reflection promoted     Problem: Mood Impairment (Psychotic Signs/Symptoms)  Goal: Improved Mood Symptoms (Psychotic Signs/Symptoms)  Outcome: Ongoing, Not Progressing  Intervention: Optimize Emotion and Mood  Flowsheets (Taken 3/27/2024 1950)  Supportive Measures:   active listening utilized   counseling provided   positive reinforcement provided   self-responsibility promoted   problem-solving facilitated   verbalization of feelings encouraged   relaxation techniques promoted   self-care encouraged   self-reflection promoted     Problem: Psychomotor Impairment (Psychotic Signs/Symptoms)  Goal: Improved Psychomotor Symptoms (Psychotic Signs/Symptoms)  Outcome: Ongoing, Not Progressing  Intervention: Manage Psychomotor Movement  Flowsheets (Taken 3/27/2024 1950)  Activity (Behavioral Health): up ad kirk     Problem: Sensory Perception  Impairment (Psychotic Signs/Symptoms)  Goal: Decreased Sensory Symptoms (Psychotic Signs/Symptoms)  Outcome: Ongoing, Not Progressing  Intervention: Minimize and Manage Sensory Impairment  Flowsheets (Taken 3/27/2024 1950)  Sensory Stimulation Regulation: quiet environment promoted     Problem: Sleep Disturbance (Psychotic Signs/Symptoms)  Goal: Improved Sleep (Psychotic Signs/Symptoms)  Outcome: Ongoing, Not Progressing  Intervention: Promote Healthy Sleep Hygiene  Flowsheets (Taken 3/27/2024 1950)  Sleep Hygiene Promotion: relaxation techniques promoted     Problem: Social, Occupational or Functional Impairment (Psychotic Signs/Symptoms)  Goal: Enhanced Social, Occupational or Functional Skills (Psychotic Signs/Symptoms)  Outcome: Ongoing, Not Progressing  Intervention: Promote Social, Occupational and Functional Ability  Flowsheets (Taken 3/27/2024 1950)  Trust Relationship/Rapport:   care explained   thoughts/feelings acknowledged     Problem: Activity and Energy Impairment (Excessive Substance Use)  Goal: Optimized Energy Level (Excessive Substance Use)  Outcome: Ongoing, Not Progressing  Intervention: Optimize Energy Level  Flowsheets (Taken 3/27/2024 1950)  Activity (Behavioral Health): up ad kirk     Problem: Behavior Regulation Impairment (Excessive Substance Use)  Goal: Improved Behavioral Control (Excessive Substance Use)  Outcome: Ongoing, Not Progressing  Intervention: Promote Behavior and Impulse Control  Flowsheets (Taken 3/27/2024 1950)  Behavior Management:   boundaries reinforced   impulse control promoted     Problem: Decreased Participation and Engagement (Excessive Substance Use)  Goal: Increased Participation and Engagement (Excessive Substance Use)  Outcome: Ongoing, Not Progressing  Intervention: Facilitate Participation and Engagement  Flowsheets (Taken 3/27/2024 2223)  Supportive Measures:   active listening utilized   positive reinforcement provided   self-responsibility promoted    problem-solving facilitated   verbalization of feelings encouraged   relaxation techniques promoted   self-care encouraged   self-reflection promoted     Problem: Physiologic Impairment (Excessive Substance Use)  Goal: Improved Physiologic Symptoms (Excessive Substance Use)  Outcome: Ongoing, Not Progressing

## 2024-03-28 NOTE — PROGRESS NOTES
Recreation Therapy Progress Note    Date:  03/28/2024    Time:  1400    Group Title:  Leisure skills    Mood:  Patient anxious distracted thinking in very depressed    Behavior:  Patient attended in group but was not able to participate but very very little.  Patient has distracted think.  Patient cried at intervals in group.  Patient did request a journal but then when I gave with a journal she was not able to start the writing.     Affect:  Patient crying off and on patient depressed patient very anxious    Speech:  Patient talking but rambling sentences.  Patient needed redirection in group    Cognition:  Patient was not able to focused on cognitive game with peers and staff    Participation Level:  Patient was in group however participation level is very minimal due to her distracted thinking    Intervention:  Continue RT services continue to assist patient one-to-one as needed          Recreation Therapist   Signature:  Deborah Basilio ma ctrs

## 2024-03-28 NOTE — PLAN OF CARE
Problem: Suicide Risk  Goal: Absence of Self-Harm  Outcome: Ongoing, Not Progressing  Intervention: Assess Risk to Self and Maintain Safety  Flowsheets (Taken 3/27/2024 2040)  Behavior Management:   impulse control promoted   behavioral plan developed     Problem: Suicide Risk  Goal: Absence of Self-Harm  Intervention: Assess Risk to Self and Maintain Safety  Flowsheets (Taken 3/27/2024 2040)  Behavior Management:   impulse control promoted   behavioral plan developed

## 2024-03-28 NOTE — PROGRESS NOTES
Recreation Therapy Assessment    1. MOOD:  Patient is anxious depressed and distracted thinking    2. BEHAVIOR:  Patient is cooperative throughout interview however patient can not remember why she is in the hospital.    3. AFFECT:  Patient is very anxious and irritable with distracted thinking    4. COGNITION:  Patient is alert but can not remember why she is in the hospital.     5. MOTOR BEHAVIOR/SKILLS:  Ambulatory    6. SPEECH:  Normal     7. LEISURE FUNCTIONING:  Patient has declined and leisure skills interest    8. LEISURE NEEDS:  To regain positive quality of life and leisure skills interests  9. PT EDUCATION LEVEL:  High school patient states.  Patient seems confused when talking and answering questions.    10. WHAT IS THE BEST THING THAT EVER HAPPENED TO YOU?  Patient did not answer    11. THINGS THAT FRUSTRATE AND ANGER ME ARE:  I do not know why I am here patient stated    12. WHEN I GET ANGRY, I USUALLY:  Walk away and isolate patient states    13. MY RELATIONSHIP WITH MOST PEOPLE ARE:  Not good lately patient states    14. LEISURE INTERESTS/SKILLS:  Patient used to enjoy music movies  and playing with her child but not lately    15. LEISURE BARRIERS:  Patient has declined in leisure skills interests.  Patient can not remember why she is in the hospital she states.    16. ASSESSMENT SUMMARY:  Patient is a 33-year-old white female.  Patient was just discharged from this unit a few days ago.  Patient states she does not know why she is in the hospital.  Patient has 1 child that she does not want to talk about.  Patient states child is with family.  Patient states she lives with her grandmother.  Patient states she has not been eating.  Patient states she was smoking THC.  But she can not remember why she is in the hospital.  Patient states      17. [place] PLAN FOR RECREATION THERAPY:  Patient will receive recreational therapy services 2 times a day and 5 times a week with Deborah vela .  Patient will be  assisted to complete 3-4 assignments on problem-solving skills and emotional outlets to enhance coping skills in daily life.  Patient will be assisted to complete 3-4 assignments on her leisure interests to enhance appropriate, social skills emotional outlets, self-worth and quality of life.  Patient will utilize art music cognitive games and exercise to achieve these goals.  Assist patient one-to-one as needed to achieve goals and be successful at home.  Patient very confused throughout interview.  Patient is currently sitting on the floor in a fetal position once she lives interview with RT staff.      18. SIGNATURE/CREDENTIALS:   Deborah Basilio ma ctrs                                                                   DATE:  03/28/2024                          TIME:  8:33 a.m.        RECREATION THERAPIST  RED

## 2024-03-28 NOTE — PROGRESS NOTES
Inpatient Nutrition Assessment    Admit Date: 3/27/2024   Total duration of encounter: 1 day   Patient Age: 33 y.o.    Nutrition Recommendation/Prescription     Continue with regular diet as tolerated  Will provide Boost Plus TID due to poor intake (360 kcal and 14 gm protein per serving)  Consider appetite stimulant due to continued anorexia over past few weeks  Weigh daily due to BMI 15.67 (severely underweight)    Communication of Recommendations:  EMR    Nutrition Assessment     Malnutrition Assessment/Nutrition-Focused Physical Exam    Malnutrition Context: acute illness or injury (03/28/24 1429)  Malnutrition Level: severe (03/28/24 1429)  Energy Intake (Malnutrition): less than 75% for greater than or equal to 1 month (03/28/24 1429)     Subcutaneous Fat (Malnutrition): severe depletion (03/28/24 1429)  Orbital Region (Subcutaneous Fat Loss): severe depletion  Upper Arm Region (Subcutaneous Fat Loss): severe depletion     Muscle Mass (Malnutrition): severe depletion (03/28/24 1429)  [place] Region (Muscle Loss): severe depletion  Clavicle Bone Region (Muscle Loss): severe depletion                 Posterior Calf Region (Muscle Loss): severe depletion           A minimum of two characteristics is recommended for diagnosis of either severe or non-severe malnutrition.    Chart Review    Reason Seen: continuous nutrition monitoring/BMI 15.67    Malnutrition Screening Tool Results              Diagnosis:  Bipolar    Relevant Medical History:   Bipolar, anxiety, depression    Scheduled Medications:  nicotine, 1 patch, Daily    Continuous Infusions:   PRN Medications: aluminum-magnesium hydroxide-simethicone, hydrOXYzine pamoate, ibuprofen, OLANZapine zydis    Calorie Containing IV Medications: no significant kcals from medications at this time    Recent Labs   Lab 03/27/24  1316      K 3.8   CALCIUM 10.1   CHLORIDE 103   CO2 25   BUN 5.4*   CREATININE 0.70   EGFRNORACEVR >60   GLUCOSE 104*   BILITOT 0.5  "  ALKPHOS 63   ALT 19   AST 18   ALBUMIN 4.1   WBC 9.54   HGB 14.8   HCT 43.8     Nutrition Orders:  Diet Adult Regular PEC/CEC - Styrofoam      Appetite/Oral Intake: poor/0-25% of meals  Factors Affecting Nutritional Intake: decreased appetite and depression  Food/Anabaptist/Cultural Preferences: none reported  Food Allergies: none reported  Last Bowel Movement: 24  Wound(s):  intact    Comments    (3/28) Pt with poor appetite and intake for a few weeks due to depression. Pt did consume 100% of breakfast this morning and 75% of lunch today. No N/V/C/D. Able to feed self. No difficulties chewing or swallowing. Pt does appear cachectic looking. UBW: 90# per EMR. Med/labs reviewed.     Anthropometrics    Height: 5' 4" (162.6 cm),    Last Weight: 41.4 kg (91 lb 4.3 oz) (24), Weight Method: Standard Scale  BMI (Calculated): 15.7  BMI Classification: underweight (BMI less than 18.5)     Ideal Body Weight (IBW), Female: 120 lb     % Ideal Body Weight, Female (lb): 76.06 %                    Usual Body Weight (UBW), k.9 kg  % Usual Body Weight: 101.43     Usual Weight Provided By: EMR weight history    Wt Readings from Last 5 Encounters:   24 41.4 kg (91 lb 4.3 oz)   24 44.5 kg (98 lb)   24 41.5 kg (91 lb 9.6 oz)   24 49.9 kg (110 lb)   24 45 kg (99 lb 3.2 oz)     Weight Change(s) Since Admission: new admit  Wt Readings from Last 1 Encounters:   24 41.4 kg (91 lb 4.3 oz)   Admit Weight: 41.4 kg (91 lb 4.3 oz) (24), Weight Method: Standard Scale    Estimated Needs    Weight Used For Calorie Calculations: 41.1 kg (90 lb 9.7 oz)  Energy Calorie Requirements (kcal): 1439 kcal (35 kcal/kg)  Energy Need Method: Kcal/kg  Weight Used For Protein Calculations: 41.4 kg (91 lb 4.3 oz)  Protein Requirements: 83 gm (2 gm/kg)  Fluid Requirements (mL): 1439 ml (1 ml/kcal)    Enteral Nutrition     Patient not receiving enteral nutrition at this time.    Parenteral " Nutrition     Patient not receiving parenteral nutrition support at this time.    Evaluation of Received Nutrient Intake    Calories: not meeting estimated needs  Protein: not meeting estimated needs    Patient Education     Not applicable.    Nutrition Diagnosis       PES: Severe acute disease or injury related malnutrition related to inability to consume sufficient nutrients as evidenced by less than 75% needs met for greater than or equal to 1 month, severe fat depletion, and severe muscle depletion. (new)    Nutrition Interventions     Intervention(s): general/healthful diet, commercial beverage, and collaboration with other providers    Goal: Meet greater than 80% of nutritional needs by follow-up. (new)  Goal: Maintain weight throughout hospitalization. (new)    Nutrition Goals & Monitoring     Dietitian will monitor: energy intake, weight, electrolyte/renal panel, glucose/endocrine profile, and gastrointestinal profile    Nutrition Risk/Follow-Up: high (follow-up in 1-4 days)   Please consult if re-assessment needed sooner.

## 2024-03-28 NOTE — SUBJECTIVE & OBJECTIVE
Past Medical History:   Diagnosis Date    Anxiety disorder, unspecified     Bipolar disorder, unspecified     Hallucination     History of psychiatric hospitalization     Hx of psychiatric care     Psychiatric problem     Psychosis     Sleep difficulties     Substance abuse     Withdrawal symptoms, alcohol        No past surgical history on file.    Review of patient's allergies indicates:   Allergen Reactions    Codeine Anaphylaxis and Rash    Hydrocodone-acetaminophen Other (See Comments) and Swelling     Facial and mouth swelling    Diphenhydramine-pseudoephed     Diphenhydramine hcl Itching and Rash       Current Facility-Administered Medications on File Prior to Encounter   Medication    [COMPLETED] acetaminophen tablet 1,000 mg    [COMPLETED] ondansetron disintegrating tablet 4 mg     Current Outpatient Medications on File Prior to Encounter   Medication Sig    nicotine (NICODERM CQ) 14 mg/24 hr Place 1 patch onto the skin once daily. APPLY 1 PATCH DAILY AND REMOVE WITH DAILY BATH AND REPLACE WITH NEW PATCH for 21 days    OLANZapine (ZYPREXA) 10 MG tablet Take 1 tablet (10 mg total) by mouth every evening.    OXcarbazepine (TRILEPTAL) 300 MG Tab Take 1 tablet (300 mg total) by mouth 2 (two) times daily.     Family History       Problem Relation (Age of Onset)    Bipolar disorder Mother    Depression Sister    Suicide Mother, Sister          Tobacco Use    Smoking status: Every Day     Types: Cigarettes    Smokeless tobacco: Never   Substance and Sexual Activity    Alcohol use: Not on file    Drug use: Not Currently    Sexual activity: Not on file     Review of Systems   Constitutional:  Negative for fever.   HENT: Negative.     Eyes: Negative.    Respiratory: Negative.  Negative for shortness of breath.    Cardiovascular:  Negative for chest pain.   Gastrointestinal:  Negative for abdominal distention, abdominal pain, nausea and vomiting.   Musculoskeletal:  Negative for back pain.   Neurological: Negative.     Psychiatric/Behavioral:  Positive for behavioral problems, dysphoric mood and suicidal ideas. The patient is nervous/anxious.      Objective:     Vital Signs (Most Recent):  Temp: 98.3 °F (36.8 °C) (03/28/24 0611)  Pulse: 84 (03/28/24 0611)  Resp: 16 (03/28/24 0611)  BP: 106/66 (03/28/24 0611)  SpO2: 99 % (03/28/24 0611) Vital Signs (24h Range):  Temp:  [98 °F (36.7 °C)-98.3 °F (36.8 °C)] 98.3 °F (36.8 °C)  Pulse:  [65-84] 84  Resp:  [16-18] 16  SpO2:  [99 %-100 %] 99 %  BP: (106-117)/(66-78) 106/66     Weight: 41.4 kg (91 lb 4.3 oz)  Body mass index is 15.67 kg/m².     Physical Exam  Vitals and nursing note reviewed. Exam conducted with a chaperone present.   Constitutional:       General: She is not in acute distress.     Appearance: Normal appearance.   HENT:      Head: Normocephalic and atraumatic.      Nose: Nose normal.      Mouth/Throat:      Mouth: Mucous membranes are moist.   Eyes:      Extraocular Movements: Extraocular movements intact and EOM normal.      Conjunctiva/sclera: Conjunctivae normal.      Pupils: Pupils are equal, round, and reactive to light.   Cardiovascular:      Rate and Rhythm: Normal rate and regular rhythm.      Heart sounds: Normal heart sounds. No murmur heard.     No gallop.   Pulmonary:      Effort: Pulmonary effort is normal.      Breath sounds: Normal breath sounds. No wheezing, rhonchi or rales.   Musculoskeletal:         General: No swelling or deformity. Normal range of motion.      Cervical back: Normal range of motion and neck supple.   Skin:     General: Skin is warm and dry.   Neurological:      General: No focal deficit present.      Mental Status: She is alert.      Gait: Gait normal.   Psychiatric:         Mood and Affect: Mood is depressed. Affect is flat.         Speech: Speech is delayed.         Behavior: Behavior is slowed and withdrawn.         CRANIAL NERVES     CN I  cranial nerve I not tested    CN II   Visual fields full to confrontation.     CN III, IV, VI    Pupils are equal, round, and reactive to light.  Extraocular motions are normal.   Right pupil: Accommodation: intact.   Left pupil: Accommodation: intact.   CN III: no CN III palsy  CN VI: no CN VI palsy    CN V   Facial sensation intact.   Right facial sensation deficit: none  Left facial sensation deficit: none    CN VII   Facial expression full, symmetric.   Right facial weakness: none  Left facial weakness: none    CN VIII   CN VIII normal.   Hearing: intact    CN IX, X   CN IX normal.   CN X normal.   Palate: symmetric    CN XI   CN XI normal.   Right sternocleidomastoid strength: normal  Left sternocleidomastoid strength: normal  Right trapezius strength: normal  Left trapezius strength: normal    CN XII   CN XII normal.   Tongue: not atrophic  Fasciculations: absent  Tongue deviation: none         Significant Labs: All pertinent labs within the past 24 hours have been reviewed.  Recent Lab Results         03/28/24  0528   03/27/24  1454        Phencyclidine   Negative       Amphetamines, Urine   Negative       Appearance, UA   Clear       Bacteria, UA   Few       Barbituates, Urine   Negative       Benzodiazepine, Urine   Negative       Bilirubin, UA   Negative       Cannabinoids, Urine   Positive       Cocaine, Urine   Negative       Color, UA   Yellow       Gluc Fast 84         Glucose, UA   Negative       Ketones, UA   Negative       Leukocyte Esterase, UA   Negative       NITRITE UA   Negative       Blood, UA   Negative       Opiates, Urine   Negative       pH, UA   7.5       pH, Urine   7.5       hCG Qualitative, Urine   Negative       Protein, UA   Negative       RBC, UA   None Seen       Specific Gravity,UA   1.015       Specific Gravity, Urine Auto   1.015       Squam Epithel, UA   Many       Syphilis Antibody Nonreactive         Urobilinogen, UA   1.0       WBC, UA   0-5               Significant Imaging: I have reviewed all pertinent imaging results/findings within the past 24 hours.

## 2024-03-28 NOTE — HPI
33-year-old white female with a history of prior diagnosis of bipolar affective disorder versus schizoaffective disorder bipolar type who at this time now presents to this facility once again after has been discharge for only 1 week.  Patient was apparently he was brought in this time because of increasing difficulty she was experienced in terms of paranoia with the lack of trust with others.    When questioned here last time he was shown a significant level of support before he was discharged back in the community and reportedly has been adherent to medications although appears to be unlikely.  Patient was discharged on trials of Zyprexa in trials of antidepressant therapy.    Patient this time reports ongoing symptom complexes characterized by the followin.  Disorganized thinking   2. Difficulties in terms of questionable thoughts to self-harm   3. Ever present anxiety reaching levels of psychotic process.    4. Reported initial as well as terminal insomnia  5. Increased degrees of psychomotor restlessness   6. Clear evidence of misinterpretation events around her during the course of the assessment.        Patient was admitted at this time on order protective custody initiated by Mom over concerns of psychosis.  Patient was reports adamantly that she has been comply with the medications.  We will be very suspicious that patient was not been fully adherent to medications as mother has pointed this out.    Patient was this time is not a reliable historian.  She states upon discharge from this facility last she was not able to return to work.    Patient did not identify issues of trauma at this time.  Her reliability was poor.  Patient was does not has a history of previous attempts to self injure patient was report but once again she was not a reliable historian.      Old records we will need to be reviewed.    Patient was once again does present with a cannabis in her urine toxicology screen.  She states she  was not been smoking.  Is most likely that she has remained abstinence from cannabis.

## 2024-03-28 NOTE — GROUP NOTE
Group Psychotherapy       Group Focus: Stress Management      Number of patients in attendance: 6    Managing stress by recognizing what makes us happy and incorporating those things in our day to day lives.  Patients went around the room and named the things that make them happy and why.  Then they were to figure out and say how to incorporate those things in their lives.    Group Start Time: 2000  Group End Time:  2035  Groups Date: 3/27/2024  Group Topic:  Behavioral Health  Group Department: [facility]  Group Facilitators:  Linda Gil RN  _____________________________________________________________________    Patient Name: Felicita Victoria  MRN: [id number]  Patient Class: IP- Psych   Admission Date\Time: 3/27/2024  6:50 PM  Hospital Length of Stay: 0  Primary Care Provider: Angel Kerns MD     Referred by: Behavioral Medicine Unit Treatment Team     Target symptoms: Depression and Anxiety     Patient's response to treatment: Active Listening     Progress toward goals: Not progressing     Interval History: participated minimally      Diagnosis: Psychosi     Plan: Continue treatment on BMU

## 2024-03-28 NOTE — PLAN OF CARE
Problem: Cognitive Impairment (Psychotic Signs/Symptoms)  Goal: Optimal Cognitive Function (Psychotic Signs/Symptoms)  Outcome: Ongoing, Progressing  Intervention: Support and Promote Cognitive Ability  Flowsheets (Taken 3/28/2024 1003)  Trust Relationship/Rapport:   care explained   choices provided   emotional support provided   empathic listening provided   questions answered   questions encouraged   reassurance provided   thoughts/feelings acknowledged     Problem: Mood Impairment (Psychotic Signs/Symptoms)  Goal: Improved Mood Symptoms (Psychotic Signs/Symptoms)  Outcome: Ongoing, Progressing  Intervention: Optimize Emotion and Mood  Flowsheets (Taken 3/28/2024 1003)  Supportive Measures:   self-care encouraged   verbalization of feelings encouraged   positive reinforcement provided   active listening utilized   counseling provided  Diversional Activity: journaling     Problem: Activity and Energy Impairment (Excessive Substance Use)  Goal: Optimized Energy Level (Excessive Substance Use)  Outcome: Ongoing, Progressing  Intervention: Optimize Energy Level  Flowsheets (Taken 3/28/2024 1003)  Activity (Behavioral Health):   activity encouraged   up ad kirk  Diversional Activity: journaling     Problem: Suicide Risk  Goal: Absence of Self-Harm  Outcome: Ongoing, Progressing  Intervention: Assess Risk to Self and Maintain Safety  Flowsheets (Taken 3/28/2024 1003)  Behavior Management:   impulse control promoted   behavioral plan reviewed  Self-Harm Prevention: environmental self-harm risks assessed  Intervention: Promote Psychosocial Wellbeing  Flowsheets (Taken 3/28/2024 1003)  Sleep/Rest Enhancement: consistent schedule promoted  Supportive Measures:   self-care encouraged   verbalization of feelings encouraged   positive reinforcement provided   active listening utilized   counseling provided  Intervention: Establish Safety Plan and Continuity of Care  Flowsheets (Taken 3/28/2024 1003)  Safe Transition  Promotion: (suicide assessment performed; patient denies SI) other (see comments)

## 2024-03-28 NOTE — SUBJECTIVE & OBJECTIVE
Patient History               Medical as of 3/28/2024       Past Medical History       Diagnosis Date Comments Source    Anxiety disorder, unspecified -- -- Provider    Bipolar disorder, unspecified -- -- Provider    Hallucination -- -- Provider    History of psychiatric hospitalization -- -- Provider    Hx of psychiatric care -- -- Provider    Ankita -- -- Provider    Psychiatric problem -- -- Provider    Psychosis -- -- Provider    Sleep difficulties -- -- Provider    Substance abuse -- -- Provider    Withdrawal symptoms, alcohol -- -- Provider                          Surgical as of 3/28/2024    Past Surgical History: Patient provided no pertinent surgical history.               Family as of 3/28/2024       Problem Relation Name Age of Onset Comments Source    Suicide Mother -- -- -- Provider    Bipolar disorder Mother -- -- -- Provider    Suicide Sister -- -- -- Provider    Depression Sister -- -- -- Provider    No Known Problems Other -- -- -- Provider                  Tobacco Use as of 3/28/2024       Smoking Status Smoking Start Date Last Attempt to Quit Current Packs/Day Average Packs/Day    Every Day -- -- --       Smokeless Status Smokeless Type Smokeless Quit Date    Never -- --      Source    Provider                  Alcohol Use as of 3/28/2024       Alcohol Use Drinks/Week Alcohol/Week Comments Source    Not Currently   -- -- Provider                  Drug Use as of 3/28/2024       Drug Use Types Frequency Comments Source    Not Currently -- -- -- Provider                  Sexual Activity as of 3/28/2024       Sexually Active Birth Control Partners Comments Source    Not Asked -- -- -- Provider                  Activities of Daily Living as of 3/28/2024    None               Social Documentation as of 3/28/2024    Patient was mother of 1 live birth.  She states that child is currently with family members.  Patient was in the past has been able to maintain employment but at this time is unemployed.   She currently living in housing with her child and herself.  She does work.  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      Patient this time continued in the initial assessment to have positive response to internal stimuli.   Source: Provider               Occupational as of 3/28/2024    None               Socioeconomic as of 3/28/2024       Marital Status Spouse Name Number of Children Years Education Education Level Preferred Language Ethnicity Race Source    Single -- 1 -- -- English Not  or /a White Provider                  Pertinent History       Question Response Comments    Lives with family --    Place in Birth Order -- --    Lives in home --    Number of Siblings -- --    Raised by biological parents --    Legal Involvement civil and criminal litigation --    Childhood Trauma early trauma --    Criminal History of -- --    Financial Status employed --    Highest Level of Education unfinished highschool --    Does patient have access to a firearm? No --     Service No --    Primary Leisure Activity -- --    Spirituality -- --          Past Medical History:   Diagnosis Date    Anxiety disorder, unspecified     Bipolar disorder, unspecified     Hallucination     History of psychiatric hospitalization     Hx of psychiatric care     Ankita     Psychiatric problem     Psychosis     Sleep difficulties     Substance abuse     Withdrawal symptoms, alcohol      No past surgical history on file.  Family History       Problem Relation (Age of Onset)    Bipolar disorder Mother    Depression Sister    No Known Problems Other    Suicide Mother, Sister          Tobacco Use    Smoking status: Every Day     Types: Cigarettes    Smokeless tobacco: Never   Substance and Sexual Activity    Alcohol use: Not Currently    Drug use: Not Currently    Sexual activity: Not on file     Review  "of patient's allergies indicates:   Allergen Reactions    Codeine Anaphylaxis and Rash    Hydrocodone-acetaminophen Other (See Comments) and Swelling     Facial and mouth swelling    Diphenhydramine-pseudoephed     Diphenhydramine hcl Itching and Rash       No current facility-administered medications on file prior to encounter.     Current Outpatient Medications on File Prior to Encounter   Medication Sig    nicotine (NICODERM CQ) 14 mg/24 hr Place 1 patch onto the skin once daily. APPLY 1 PATCH DAILY AND REMOVE WITH DAILY BATH AND REPLACE WITH NEW PATCH for 21 days    OXcarbazepine (TRILEPTAL) 300 MG Tab Take 1 tablet (300 mg total) by mouth 2 (two) times daily.    [DISCONTINUED] OLANZapine (ZYPREXA) 10 MG tablet Take 1 tablet (10 mg total) by mouth every evening.     Psychotherapeutics (From admission, onward)      Start     Stop Route Frequency Ordered    03/28/24 2100  OLANZapine tablet 15 mg         -- Oral Nightly 03/28/24 1512 03/28/24 2100  LORazepam tablet 1 mg         -- Oral 3 times daily 03/28/24 1515 03/27/24 2059  OLANZapine zydis disintegrating tablet 10 mg         -- Oral Every 8 hours PRN 03/27/24 2059          Review of Systems   Psychiatric/Behavioral:  Positive for confusion, decreased concentration, dysphoric mood and sleep disturbance. The patient is nervous/anxious.      Strengths and Liabilities:  Strengths:  Good physical health, above average for age into lack when functioning well    weaknesses:. Poor adherence to medications, poor insight and judgment    Objective:     Vital Signs (Most Recent):  Temp: 98.3 °F (36.8 °C) (03/28/24 0611)  Pulse: 93 (03/28/24 1649)  Resp: 16 (03/28/24 1649)  BP: 122/85 (03/28/24 1649)  SpO2: 99 % (03/28/24 1649) Vital Signs (24h Range):  Temp:  [98 °F (36.7 °C)-98.3 °F (36.8 °C)] 98.3 °F (36.8 °C)  Pulse:  [72-93] 93  Resp:  [16] 16  SpO2:  [99 %] 99 %  BP: (106-122)/(66-85) 122/85     Height: 5' 4" (162.6 cm)  Weight: 41.4 kg (91 lb 4.3 oz)  Body " mass index is 15.67 kg/m².    No intake or output data in the 24 hours ending 03/28/24 1650       Physical Exam    Mental status examination:  33-year-old white female who at this time continues to struggle in terms overall reality testing.  Whose speech was with good articulation and execution.  He was thought processes were non spontaneously produced were disorganized.  Her thought content demonstrated no clear evidence of any visual hallucinations.  Although she did not identify auditory hallucinations suspect she was responding based upon her appearance and reactions to stimulants.  Her insight and judgment deemed to be globally impaired.  She remained hypervigilant and paranoid.  She appears to be somewhat psychomotor retarded.    On cognitive evaluation he was alert and oriented to self and situation.  She would difficulty in terms of thought processes major cognitive evaluation could not be completed as a function of her current level of distress and impairment.        Significant Labs: Last 72 Hours:   Recent Lab Results         03/28/24  0528   03/27/24  1454   03/27/24  1316   03/27/24  1243        Influenza A, Molecular       Not Detected       Influenza B, Molecular       Not Detected       Phencyclidine   Negative           Albumin/Globulin Ratio     1.2         Acetaminophen Level     <17.4         Albumin     4.1         Alcohol, Serum     <10.0  Comment: This assay is performed for medical purposes only.         ALP     63         ALT     19         Amphetamines, Urine   Negative           Appearance, UA   Clear           AST     18         Bacteria, UA   Few           Barbituates, Urine   Negative           Baso #     0.02         Basophil %     0.2         Benzodiazepine, Urine   Negative           BILIRUBIN TOTAL     0.5         Bilirubin, UA   Negative           BUN     5.4         Calcium     10.1         Cannabinoids, Urine   Positive           Chloride     103         CO2     25          Cocaine, Urine   Negative           Color, UA   Yellow           Creatinine     0.70         eGFR     >60         Eos #     0.01         Eos %     0.1         Globulin, Total     3.3         Gluc Fast 84             Glucose     104         Glucose, UA   Negative           Hematocrit     43.8         Hemoglobin     14.8         Immature Grans (Abs)     0.01         Immature Granulocytes     0.1         Ketones, UA   Negative           Leukocyte Esterase, UA   Negative           Lymph #     2.30         LYMPH %     24.1         MCH     32.0         MCHC     33.8         MCV     94.8         Mono #     0.39         Mono %     4.1         MPV     9.8         Neut #     6.81         Neut %     71.4         NITRITE UA   Negative           Blood, UA   Negative           Opiates, Urine   Negative           pH, UA   7.5           pH, Urine   7.5           Platelet Count     205         Potassium     3.8         hCG Qualitative, Urine   Negative           PROTEIN TOTAL     7.4         Protein, UA   Negative           RBC     4.62         RBC, UA   None Seen           RDW     12.0         SARS-CoV2 (COVID-19) Qualitative PCR       Not Detected       Sodium     137         Specific Gravity,UA   1.015           Specific Gravity, Urine Auto   1.015           Squam Epithel, UA   Many           Syphilis Antibody Nonreactive             TSH     0.649         Urobilinogen, UA   1.0           WBC, UA   0-5           WBC     9.54                 Significant Imaging: None

## 2024-03-28 NOTE — NURSING
"Admission Note:    Felicita Victoria is a 33 y.o. female, : 1990, MRN: [id number], admitted on 3/27/2024 to [facility] ([facility]) for Angel Kerns MD with a diagnosis of Psychosis [F29]. Patient admitted on a status of PEC/CEC. Felicita reports the following allergies: Codeine, hydorcodone-acetaminophine, diphenhydramine-pseudophed.      Felicita was brought in to [facility] by [place] police on an OPC filed by grandmother.  She has a history of bipolar disorder and has been non compliant with medications per ER the OPC.  It also documented that Felicita was paranoid thinking everyone was after her and suicidal ideations, threatening to hang herself.       Upon assessment, Felicita demonstrated an affect that was sad, flat, tearful, anxious, and  labile. Also seemed to move in and out of confusion in regards to her situation, saying repeatedly, "I don't understand why I am here." She can not recall events of Patient demonstrated mood during assessment that was depressed, anxious, and fearful. Felicita currently denies ever being suicidal or saying she was suicidal to her grandmother, but does acknowledge that she did go see her today and is unsure what happened while she was there.  She had an appearance that was clean and good hygiene.    Patient denies suicidal ideation. Patient denies suicide plan. Patient denies hallucinations.     Her urine drug screen was positive for THC.  She received acetaminophen 1,000mg  for lower back pain and Zophran 4mg for nausea and vomiting in the ED @ 1509. Denies both pain and nausea at this time.  Did eat a sandwich and had a snack @ 2000. No vomiting noted.      Felicita's  height is 5' 4" (1.626 m) and weight is 41.4 kg (91 lb 4.3 oz). Her temperature is 98 °F (36.7 °C). Her blood pressure is 117/78 and her pulse is 72. Her respiration is 16.     Felicita's last BM was noted on 2024.    Metal detector screening performed via security " personnel. The result of the scan was negative.  Head-to-toe physical assessment completed with the following findings: Pt has bilateral forearm, flank, and groin tattoos. Rest of skin was dry and intact. upon body screen. A full skin assessment was performed.  Felicita was oriented to unit, staff, peers, and room. Patient belongings/valuables stored in locked intake room cabinet and changes of clothing provided to patient. Felicita was placed on Q 15 min observations.

## 2024-03-28 NOTE — ASSESSMENT & PLAN NOTE
Once again patient was re-educated on the overall concerns about use of cannabis in conjunction with major mental illness such as bipolar affective disorder increased risk for decompensation.  Patient was encouraged to abstain.  Certainly patient was benefit from minimally referral to IOP level of care for substance treatment classes.  This will need to occur post discharge.
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
## 2024-03-28 NOTE — HPI
34 yo CF presented to [facility] ED 3/27/24 on OPC, with severe depression and SI. Threatened to hang herself. PMH bipolar and tobacco use. Pt was cooperative for examination. Denies CP, SOB, trouble tasting or smelling.

## 2024-03-28 NOTE — CONSULTS
[facility] Hospital Medicine  Consult Note    Patient Name: Felicita Victoria  MRN: [id number]  Admission Date: 3/27/2024  Hospital Length of Stay: 1 days  Attending Physician: Angel Kerns MD   Primary Care Provider: Cherrie, Primary Doctor   Consults: Ailyn Montemayor DO - Hospitalist          Patient information was obtained from ER records.     Consults  Subjective:     Principal Problem:  Bipolar 1 disorder mixed severe  Chief Complaint:    SI    HPI: 32 yo CF presented to [facility] 3/27/24 on OPC, with severe depression and SI. Threatened to hang herself. PMH bipolar and tobacco use. Pt was cooperative for examination. Denies CP, SOB, trouble tasting or smelling.     Past Medical History:   Diagnosis Date    Anxiety disorder, unspecified     Bipolar disorder, unspecified     Hallucination     History of psychiatric hospitalization     Hx of psychiatric care     Psychiatric problem     Psychosis     Sleep difficulties     Substance abuse     Withdrawal symptoms, alcohol        No past surgical history on file.    Review of patient's allergies indicates:   Allergen Reactions    Codeine Anaphylaxis and Rash    Hydrocodone-acetaminophen Other (See Comments) and Swelling     Facial and mouth swelling    Diphenhydramine-pseudoephed     Diphenhydramine hcl Itching and Rash       Current Facility-Administered Medications on File Prior to Encounter   Medication    [COMPLETED] acetaminophen tablet 1,000 mg    [COMPLETED] ondansetron disintegrating tablet 4 mg     Current Outpatient Medications on File Prior to Encounter   Medication Sig    nicotine (NICODERM CQ) 14 mg/24 hr Place 1 patch onto the skin once daily. APPLY 1 PATCH DAILY AND REMOVE WITH DAILY BATH AND REPLACE WITH NEW PATCH for 21 days    OLANZapine (ZYPREXA) 10 MG tablet Take 1 tablet (10 mg total) by mouth every evening.    OXcarbazepine (TRILEPTAL) 300 MG Tab Take 1 tablet (300 mg total) by mouth 2 (two) times daily.      Family History       Problem Relation (Age of Onset)    Bipolar disorder Mother    Depression Sister    Suicide Mother, Sister          Tobacco Use    Smoking status: Every Day     Types: Cigarettes    Smokeless tobacco: Never   Substance and Sexual Activity    Alcohol use: Not on file    Drug use: Not Currently    Sexual activity: Not on file     Review of Systems   Constitutional:  Negative for fever.   HENT: Negative.     Eyes: Negative.    Respiratory: Negative.  Negative for shortness of breath.    Cardiovascular:  Negative for chest pain.   Gastrointestinal:  Negative for abdominal distention, abdominal pain, nausea and vomiting.   Musculoskeletal:  Negative for back pain.   Neurological: Negative.    Psychiatric/Behavioral:  Positive for behavioral problems, dysphoric mood and suicidal ideas. The patient is nervous/anxious.      Objective:     Vital Signs (Most Recent):  Temp: 98.3 °F (36.8 °C) (03/28/24 0611)  Pulse: 84 (03/28/24 0611)  Resp: 16 (03/28/24 0611)  BP: 106/66 (03/28/24 0611)  SpO2: 99 % (03/28/24 0611) Vital Signs (24h Range):  Temp:  [98 °F (36.7 °C)-98.3 °F (36.8 °C)] 98.3 °F (36.8 °C)  Pulse:  [65-84] 84  Resp:  [16-18] 16  SpO2:  [99 %-100 %] 99 %  BP: (106-117)/(66-78) 106/66     Weight: 41.4 kg (91 lb 4.3 oz)  Body mass index is 15.67 kg/m².     Physical Exam  Vitals and nursing note reviewed. Exam conducted with a chaperone present.   Constitutional:       General: She is not in acute distress.     Appearance: Normal appearance.   HENT:      Head: Normocephalic and atraumatic.      Nose: Nose normal.      Mouth/Throat:      Mouth: Mucous membranes are moist.   Eyes:      Extraocular Movements: Extraocular movements intact and EOM normal.      Conjunctiva/sclera: Conjunctivae normal.      Pupils: Pupils are equal, round, and reactive to light.   Cardiovascular:      Rate and Rhythm: Normal rate and regular rhythm.      Heart sounds: Normal heart sounds. No murmur heard.     No gallop.    Pulmonary:      Effort: Pulmonary effort is normal.      Breath sounds: Normal breath sounds. No wheezing, rhonchi or rales.   Musculoskeletal:         General: No swelling or deformity. Normal range of motion.      Cervical back: Normal range of motion and neck supple.   Skin:     General: Skin is warm and dry.   Neurological:      General: No focal deficit present.      Mental Status: She is alert.      Gait: Gait normal.   Psychiatric:         Mood and Affect: Mood is depressed. Affect is flat.         Speech: Speech is delayed.         Behavior: Behavior is slowed and withdrawn.         CRANIAL NERVES     CN I  cranial nerve I not tested    CN II   Visual fields full to confrontation.     CN III, IV, VI   Pupils are equal, round, and reactive to light.  Extraocular motions are normal.   Right pupil: Accommodation: intact.   Left pupil: Accommodation: intact.   CN III: no CN III palsy  CN VI: no CN VI palsy    CN V   Facial sensation intact.   Right facial sensation deficit: none  Left facial sensation deficit: none    CN VII   Facial expression full, symmetric.   Right facial weakness: none  Left facial weakness: none    CN VIII   CN VIII normal.   Hearing: intact    CN IX, X   CN IX normal.   CN X normal.   Palate: symmetric    CN XI   CN XI normal.   Right sternocleidomastoid strength: normal  Left sternocleidomastoid strength: normal  Right trapezius strength: normal  Left trapezius strength: normal    CN XII   CN XII normal.   Tongue: not atrophic  Fasciculations: absent  Tongue deviation: none         Significant Labs: All pertinent labs within the past 24 hours have been reviewed.  Recent Lab Results         03/28/24  0528   03/27/24  1454        Phencyclidine   Negative       Amphetamines, Urine   Negative       Appearance, UA   Clear       Bacteria, UA   Few       Barbituates, Urine   Negative       Benzodiazepine, Urine   Negative       Bilirubin, UA   Negative       Cannabinoids, Urine   Positive        Cocaine, Urine   Negative       Color, UA   Yellow       Gluc Fast 84         Glucose, UA   Negative       Ketones, UA   Negative       Leukocyte Esterase, UA   Negative       NITRITE UA   Negative       Blood, UA   Negative       Opiates, Urine   Negative       pH, UA   7.5       pH, Urine   7.5       hCG Qualitative, Urine   Negative       Protein, UA   Negative       RBC, UA   None Seen       Specific Gravity,UA   1.015       Specific Gravity, Urine Auto   1.015       Squam Epithel, UA   Many       Syphilis Antibody Nonreactive         Urobilinogen, UA   1.0       WBC, UA   0-5               Significant Imaging: I have reviewed all pertinent imaging results/findings within the past 24 hours.  Assessment/Plan:     Bipolar 1 disorder, mixed, severe  Mgt per psyc.  Rec cessation of illegal substances.   VS and labs reviewed and stable.      Nonadherence to medication        Tobacco use  Rec cessation. Can offer nicotine patch upon dc.        VTE Risk Mitigation (From admission, onward)      None                Thank you for your consult. I will sign off. Please contact us if you have any additional questions.    MATT MARTINEZ, DO  Department of Hospital Medicine   [facility]

## 2024-03-28 NOTE — GROUP NOTE
Group Psychotherapy       Group Focus: Promoting Healthy Lifestyles      Number of patients in attendance: 3    Group Start Time: 0900  Group End Time:  0930  Groups Date: 3/28/2024  Group Topic:  Behavioral Health  Group Department: [facility]  Group Facilitators:  Edgar De León LCSW  _____________________________________________________________________    Patient Name: Felicita Victoria  MRN: [id number]  Patient Class: IP- Psych   Admission Date\Time: 3/27/2024  6:50 PM  Hospital Length of Stay: 1  Primary Care Provider: Cherrie Primary Doctor     Referred by: Behavioral Medicine Unit Treatment Team     Target symptoms: Depression     Patient's response to treatment: Active Listening     Progress toward goals: Not progressing     Interval History: Pt attended.  Appears to be RTIS.     Diagnosis:      Plan: Continue treatment on BMU

## 2024-03-28 NOTE — PATIENT CARE CONFERENCE
Spoke with pts mother.  She states that she did not change much since discharge last time.  Has been not sleeping, crying, confused, not eating or drinking.  She also stated that she made reference to hanging herself,  she has not gone to followup or back to work.

## 2024-03-28 NOTE — H&P
[facility]  Psychiatry  History & Physical    Patient Name: Felicita Victoria  MRN: [id number]   Code Status: Full Code  Admission Date: 3/27/2024  Attending Physician: Angel Kerns MD   Primary Care Provider: Cherrie, Primary Doctor    Current Legal Status:  Physician's emergency commitment    Patient information was obtained from patient and ER records.     Subjective:     Principal Problem:<principal problem not specified>    Chief Complaint:  I do not know what is going on     HPI: 33-year-old white female with a history of prior diagnosis of bipolar affective disorder versus schizoaffective disorder bipolar type who at this time now presents to this facility once again after has been discharge for only 1 week.  Patient was apparently he was brought in this time because of increasing difficulty she was experienced in terms of paranoia with the lack of trust with others.    When questioned here last time he was shown a significant level of support before he was discharged back in the community and reportedly has been adherent to medications although appears to be unlikely.  Patient was discharged on trials of Zyprexa in trials of antidepressant therapy.    Patient this time reports ongoing symptom complexes characterized by the followin.  Disorganized thinking   2. Difficulties in terms of questionable thoughts to self-harm   3. Ever present anxiety reaching levels of psychotic process.    4. Reported initial as well as terminal insomnia  5. Increased degrees of psychomotor restlessness   6. Clear evidence of misinterpretation events around her during the course of the assessment.        Patient was admitted at this time on order protective custody initiated by Mom over concerns of psychosis.  Patient was reports adamantly that she has been comply with the medications.  We will be very suspicious that patient was not been fully adherent to medications as mother has pointed  this out.    Patient was this time is not a reliable historian.  She states upon discharge from this facility last she was not able to return to work.    Patient did not identify issues of trauma at this time.  Her reliability was poor.  Patient was does not has a history of previous attempts to self injure patient was report but once again she was not a reliable historian.      Old records we will need to be reviewed.    Patient was once again does present with a cannabis in her urine toxicology screen.  She states she was not been smoking.  Is most likely that she has remained abstinence from cannabis.         Patient History               Medical as of 3/28/2024       Past Medical History       Diagnosis Date Comments Source    Anxiety disorder, unspecified -- -- Provider    Bipolar disorder, unspecified -- -- Provider    Hallucination -- -- Provider    History of psychiatric hospitalization -- -- Provider    Hx of psychiatric care -- -- Provider    Ankita -- -- Provider    Psychiatric problem -- -- Provider    Psychosis -- -- Provider    Sleep difficulties -- -- Provider    Substance abuse -- -- Provider    Withdrawal symptoms, alcohol -- -- Provider                          Surgical as of 3/28/2024    Past Surgical History: Patient provided no pertinent surgical history.               Family as of 3/28/2024       Problem Relation Name Age of Onset Comments Source    Suicide Mother -- -- -- Provider    Bipolar disorder Mother -- -- -- Provider    Suicide Sister -- -- -- Provider    Depression Sister -- -- -- Provider    No Known Problems Other -- -- -- Provider                  Tobacco Use as of 3/28/2024       Smoking Status Smoking Start Date Last Attempt to Quit Current Packs/Day Average Packs/Day    Every Day -- -- --       Smokeless Status Smokeless Type Smokeless Quit Date    Never -- --      Source    Provider                  Alcohol Use as of 3/28/2024       Alcohol Use Drinks/Week Alcohol/Week Comments  Source    Not Currently   -- -- Provider                  Drug Use as of 3/28/2024       Drug Use Types Frequency Comments Source    Not Currently -- -- -- Provider                  Sexual Activity as of 3/28/2024       Sexually Active Birth Control Partners Comments Source    Not Asked -- -- -- Provider                  Activities of Daily Living as of 3/28/2024    None               Social Documentation as of 3/28/2024    Patient was mother of 1 live birth.  She states that child is currently with family members.  Patient was in the past has been able to maintain employment but at this time is unemployed.  She currently living in housing with her child and herself.  She does work.  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      Patient this time continued in the initial assessment to have positive response to internal stimuli.   Source: Provider               Occupational as of 3/28/2024    None               Socioeconomic as of 3/28/2024       Marital Status Spouse Name Number of Children Years Education Education Level Preferred Language Ethnicity Race Source    Single -- 1 -- -- English Not  or /a White Provider                  Pertinent History       Question Response Comments    Lives with family --    Place in Birth Order -- --    Lives in home --    Number of Siblings -- --    Raised by biological parents --    Legal Involvement civil and criminal litigation --    Childhood Trauma early trauma --    Criminal History of -- --    Financial Status employed --    Highest Level of Education unfinished highschool --    Does patient have access to a firearm? No --     Service No --    Primary Leisure Activity -- --    Spirituality -- --          Past Medical History:   Diagnosis Date    Anxiety disorder, unspecified     Bipolar disorder, unspecified     Hallucination      History of psychiatric hospitalization     Hx of psychiatric care     Ankita     Psychiatric problem     Psychosis     Sleep difficulties     Substance abuse     Withdrawal symptoms, alcohol      No past surgical history on file.  Family History       Problem Relation (Age of Onset)    Bipolar disorder Mother    Depression Sister    No Known Problems Other    Suicide Mother, Sister          Tobacco Use    Smoking status: Every Day     Types: Cigarettes    Smokeless tobacco: Never   Substance and Sexual Activity    Alcohol use: Not Currently    Drug use: Not Currently    Sexual activity: Not on file     Review of patient's allergies indicates:   Allergen Reactions    Codeine Anaphylaxis and Rash    Hydrocodone-acetaminophen Other (See Comments) and Swelling     Facial and mouth swelling    Diphenhydramine-pseudoephed     Diphenhydramine hcl Itching and Rash       No current facility-administered medications on file prior to encounter.     Current Outpatient Medications on File Prior to Encounter   Medication Sig    nicotine (NICODERM CQ) 14 mg/24 hr Place 1 patch onto the skin once daily. APPLY 1 PATCH DAILY AND REMOVE WITH DAILY BATH AND REPLACE WITH NEW PATCH for 21 days    OXcarbazepine (TRILEPTAL) 300 MG Tab Take 1 tablet (300 mg total) by mouth 2 (two) times daily.    [DISCONTINUED] OLANZapine (ZYPREXA) 10 MG tablet Take 1 tablet (10 mg total) by mouth every evening.     Psychotherapeutics (From admission, onward)      Start     Stop Route Frequency Ordered    03/28/24 2100  OLANZapine tablet 15 mg         -- Oral Nightly 03/28/24 1512 03/28/24 2100  LORazepam tablet 1 mg         -- Oral 3 times daily 03/28/24 1515 03/27/24 2059  OLANZapine zydis disintegrating tablet 10 mg         -- Oral Every 8 hours PRN 03/27/24 2059          Review of Systems   Psychiatric/Behavioral:  Positive for confusion, decreased concentration, dysphoric mood and sleep disturbance. The patient is nervous/anxious.      Strengths  "and Liabilities:  Strengths:  Good physical health, above average for age into lack when functioning well    weaknesses:. Poor adherence to medications, poor insight and judgment    Objective:     Vital Signs (Most Recent):  Temp: 98.3 °F (36.8 °C) (03/28/24 0611)  Pulse: 93 (03/28/24 1649)  Resp: 16 (03/28/24 1649)  BP: 122/85 (03/28/24 1649)  SpO2: 99 % (03/28/24 1649) Vital Signs (24h Range):  Temp:  [98 °F (36.7 °C)-98.3 °F (36.8 °C)] 98.3 °F (36.8 °C)  Pulse:  [72-93] 93  Resp:  [16] 16  SpO2:  [99 %] 99 %  BP: (106-122)/(66-85) 122/85     Height: 5' 4" (162.6 cm)  Weight: 41.4 kg (91 lb 4.3 oz)  Body mass index is 15.67 kg/m².    No intake or output data in the 24 hours ending 03/28/24 1650       Physical Exam    Mental status examination:  33-year-old white female who at this time continues to struggle in terms overall reality testing.  Whose speech was with good articulation and execution.  He was thought processes were non spontaneously produced were disorganized.  Her thought content demonstrated no clear evidence of any visual hallucinations.  Although she did not identify auditory hallucinations suspect she was responding based upon her appearance and reactions to stimulants.  Her insight and judgment deemed to be globally impaired.  She remained hypervigilant and paranoid.  She appears to be somewhat psychomotor retarded.    On cognitive evaluation he was alert and oriented to self and situation.  She would difficulty in terms of thought processes major cognitive evaluation could not be completed as a function of her current level of distress and impairment.        Significant Labs: Last 72 Hours:   Recent Lab Results         03/28/24  0528   03/27/24  1454   03/27/24  1316   03/27/24  1243        Influenza A, Molecular       Not Detected       Influenza B, Molecular       Not Detected       Phencyclidine   Negative           Albumin/Globulin Ratio     1.2         Acetaminophen Level     <17.4         " Albumin     4.1         Alcohol, Serum     <10.0  Comment: This assay is performed for medical purposes only.         ALP     63         ALT     19         Amphetamines, Urine   Negative           Appearance, UA   Clear           AST     18         Bacteria, UA   Few           Barbituates, Urine   Negative           Baso #     0.02         Basophil %     0.2         Benzodiazepine, Urine   Negative           BILIRUBIN TOTAL     0.5         Bilirubin, UA   Negative           BUN     5.4         Calcium     10.1         Cannabinoids, Urine   Positive           Chloride     103         CO2     25         Cocaine, Urine   Negative           Color, UA   Yellow           Creatinine     0.70         eGFR     >60         Eos #     0.01         Eos %     0.1         Globulin, Total     3.3         Gluc Fast 84             Glucose     104         Glucose, UA   Negative           Hematocrit     43.8         Hemoglobin     14.8         Immature Grans (Abs)     0.01         Immature Granulocytes     0.1         Ketones, UA   Negative           Leukocyte Esterase, UA   Negative           Lymph #     2.30         LYMPH %     24.1         MCH     32.0         MCHC     33.8         MCV     94.8         Mono #     0.39         Mono %     4.1         MPV     9.8         Neut #     6.81         Neut %     71.4         NITRITE UA   Negative           Blood, UA   Negative           Opiates, Urine   Negative           pH, UA   7.5           pH, Urine   7.5           Platelet Count     205         Potassium     3.8         hCG Qualitative, Urine   Negative           PROTEIN TOTAL     7.4         Protein, UA   Negative           RBC     4.62         RBC, UA   None Seen           RDW     12.0         SARS-CoV2 (COVID-19) Qualitative PCR       Not Detected       Sodium     137         Specific Gravity,UA   1.015           Specific Gravity, Urine Auto   1.015           Squam Epithel, UA   Many           Syphilis Antibody Nonreactive              TSH     0.649         Urobilinogen, UA   1.0           WBC, UA   0-5           WBC     9.54                 Significant Imaging: None  Assessment/Plan:     Bipolar 1 disorder, mixed, severe  Patient was this time presents ongoing challenges in terms of distortions perceptions and mixed mood instability.  She was recent discharge from this facility now presents once again completely decompensated.  Patient was this time has been recommended to be placed on trials of Zyprexa along with trials of Trileptal.  Patient was maintenance doses in the past been 10 mg but certainly we will need to advance to higher dosing threshold.    Cannabis use disorder, severe, dependence  Once again patient was re-educated on the overall concerns about use of cannabis in conjunction with major mental illness such as bipolar affective disorder increased risk for decompensation.  Patient was encouraged to abstain.  Certainly patient was benefit from minimally referral to [facility] level of care for substance treatment classes.  This will need to occur post discharge.       Estimated Discharge Date: 4/5/2024  Initial Discharge Plan: Home      Prognosis: Fair    Need for Continued Hospitalization:   Psychiatric illness continues to pose a potential threat to life or bodily function, of self or others, thereby requiring the need for continued inpatient psychiatric hospitalization., Protective inpatient psychiatric hospitalization required while a safe disposition plan is enacted., and Requires ongoing hospitalization for stabilization of medications.    Total Time: 50 with greater than 50% of time spent in counseling and/or coordination of care.     Angel Kerns MD   Psychiatry  [facility]

## 2024-03-28 NOTE — ASSESSMENT & PLAN NOTE
Patient was this time presents ongoing challenges in terms of distortions perceptions and mixed mood instability.  She was recent discharge from this facility now presents once again completely decompensated.  Patient was this time has been recommended to be placed on trials of Zyprexa along with trials of Trileptal.  Patient was maintenance doses in the past been 10 mg but certainly we will need to advance to higher dosing threshold.

## 2024-03-28 NOTE — NURSING
"Zyprexa zydis 10mg SL administered.  Pt paranoid and agitated, "I don't know why I'm here".  1:1 education provided.    "

## 2024-03-28 NOTE — NURSING
"Daily Nursing Note:      Behavior:    Patient (Felicita Victoria is a 33 y.o. female, : 1990, MRN: [id number]) demonstrating an affect that was congruent, sad, flat, tearful, and anxious. Feliciat demonstrating mood that is depressed and anxious. Felicita had an appearance that was disheveled with oily unkempt hair. Felicita denies suicidal ideation. Felicita denies suicide plan. Felicita denies homicidal ideation. Felicita denies hallucinations but endorses paranoia and feels like "everyone is talking about" her and that her "phone number is no longer the same" and her "watch no longer has the same number".    Felicita's  height is 5' 4" (1.626 m) and weight is 41.4 kg (91 lb 4.3 oz). Her temperature is 98.3 °F (36.8 °C). Her blood pressure is 106/66 and her pulse is 84. Her respiration is 16 and oxygen saturation is 99%.     Felicita's last BM was noted on: 3/26/24      Intervention:    Encourage Felicita to perform self-hygiene, grooming, and changing of clothing. Monitor Felicita's behavior and program compliance. Monitor Felicita for suicidal ideation, homicidal ideation, sleep disturbance, paranoia, and hallucinations. Encourage Felicita to eat all portions of meals and assess for meal preferences. Monitor Felicita for intake and output to ensure hydration. Notify the Physician for any medication refusal and any change in patient condition.      Response:    Felicita verbalizes understand of unit process and procedures. Felicita reports she was taking medications at home and she has accepted her prn medications while in facility. On assessment at start of shift the patient is squatting in the hallway and had a sad look and depressed, slouched demeanor. She came to her room to talk. She reported her depression as a 10/10 and is tearful, her anxiety "is always high naturally" and was rated 10/10. She had just taken a prn vistaril for anxiety. She slept 8 hours and reports there were no issues with sleep. " "She did eat 100% of breakfast. The patient is disoriented only to her situation. She is able to recall being here last week but is unclear of events that have passed since her discharge. She has not gone back to work, states she took her medication, denies any reason she should have THC in her drug screen and states she only had a nicotine vape. She thinks she went to outpt a few times. She denies any memory of bizarre behavior, threatening to hang herself, or anything else. She does endorse paranoid feelings that everyone is after her and talking about her and states that "my phone number is not my phone number anymore and my watch isn't the same number anymore"; asked patient if she would like her prn zydis for her paranoia and to possibly help to clear her mind and she agreed. She is cooperative but very withdrawn with poor recent memory.      Plan:     Continue to monitor per MD orders; maintain patient safety. Q15 minute safety checks in progress    "

## 2024-03-28 NOTE — NURSING
Body assessment done by myself and ADRIENNE Song, present. Pt has bilateral forearm, flank, and groin tattoos. Rest of skin was dry and intact.

## 2024-03-29 PROCEDURE — 25000003 PHARM REV CODE 250: Performed by: PSYCHIATRY & NEUROLOGY

## 2024-03-29 PROCEDURE — 11400000 HC PSYCH PRIVATE ROOM

## 2024-03-29 PROCEDURE — S4991 NICOTINE PATCH NONLEGEND: HCPCS | Performed by: PSYCHIATRY & NEUROLOGY

## 2024-03-29 RX ADMIN — OXCARBAZEPINE 300 MG: 300 TABLET, FILM COATED ORAL at 08:03

## 2024-03-29 RX ADMIN — LORAZEPAM 1 MG: 1 TABLET ORAL at 03:03

## 2024-03-29 RX ADMIN — LORAZEPAM 1 MG: 1 TABLET ORAL at 08:03

## 2024-03-29 RX ADMIN — OLANZAPINE 15 MG: 10 TABLET, FILM COATED ORAL at 08:03

## 2024-03-29 RX ADMIN — NICOTINE 1 PATCH: 21 PATCH, EXTENDED RELEASE TRANSDERMAL at 08:03

## 2024-03-29 NOTE — PROGRESS NOTES
Recreation Therapy Progress Note    Date:  03/29/2024    Time:  10:00 a.m.    Group Title:  Creative expression    Mood:  Patient less guarded less tearful this morning    Behavior:  Patient cooperative and participated more in group.  Art and music class seems to help her to relax.    Affect:  Patient less anxious and less guarded than yesterday    Speech:  Patient talking more than yesterday    Cognition:  Patient able to focused better today less distracted thinking.    Participation Level:  Patient stayed in group and participated 100% with less one-to-one needed.  To do her best on assignments    Intervention:  Continue RT services          Recreation Therapist   Signature:  Deborah Basilio MA CTRS

## 2024-03-29 NOTE — PLAN OF CARE
Problem: Cognitive Impairment (Psychotic Signs/Symptoms)  Goal: Optimal Cognitive Function (Psychotic Signs/Symptoms)  Outcome: Ongoing, Progressing     Problem: Mood Impairment (Psychotic Signs/Symptoms)  Goal: Improved Mood Symptoms (Psychotic Signs/Symptoms)  Outcome: Ongoing, Progressing  Intervention: Optimize Emotion and Mood  Flowsheets (Taken 3/29/2024 1243)  Diversional Activity:   art work   music     Problem: Activity and Energy Impairment (Excessive Substance Use)  Goal: Optimized Energy Level (Excessive Substance Use)  Outcome: Ongoing, Progressing  Flowsheets (Taken 3/29/2024 1243)  Mutually Determined Action Steps (Optimized Energy Level):   grooms self without prompting   dresses/ready for morning activity   participates in exercise activity  Intervention: Optimize Energy Level  Flowsheets (Taken 3/29/2024 1243)  Diversional Activity:   art work   music     Problem: Suicide Risk  Goal: Absence of Self-Harm  Outcome: Ongoing, Progressing  Intervention: Promote Psychosocial Wellbeing  Flowsheets (Taken 3/29/2024 1243)  Sleep/Rest Enhancement:   regular sleep/rest pattern promoted   relaxation techniques promoted  Family/Support System Care: self-care encouraged  Intervention: Establish Safety Plan and Continuity of Care  Flowsheets (Taken 3/29/2024 1243)  Safe Transition Promotion: personal safety plan developed

## 2024-03-29 NOTE — PROGRESS NOTES
Recreation Therapy Progress Note    Date:   03/29/2024     Time:  1400    Group Title:  Leisure skills    Mood:  Less anxious    Behavior:  Participate in RT group    Affect:  Less anxious less withdrawn less depressed    Speech:  Patient talking more in group    Cognition:  Patient alert and focused on assignments    Participation Level:  100% patient improving    Intervention:  Continue RT services          Recreation Therapist   Signature:  Deborah Basilio MA CTRS

## 2024-03-29 NOTE — PROGRESS NOTES
"3/29/2024 11:34 AM   Name: Felicita Victoria   : 1990   MRN: [id number]   [facility] Progress Note     SUBJECTIVE:   Pt seen and chart reviewed, received update from staff. Pt is known to me from previous inpt admissions here, reviewed intake assessments and psych eval by Dr. Kerns for this admit. Pt was admitted to this facility earlier this month, did not stay out of the hospital very long before being OPC'ed again by family. For interview, pt presents with bizarre affect, appearing hypervigilant and tense/on-edge. TP remains somewhat disorganized. She claims to not remember the events that led her back to the hospital and says she's not quite sure why she's back here. Mood very anxious and hypervigilant. She admits to lately having some AH and paranoia, also describes ideas of reference "think the people on the TV are talking about me." Hears the sounds of people walking by or footsteps when no one is around. Feels "very paranoid" today. She refused her meds last night, says "it was like 4 pills and it freaked me out." Some education and reassurance provided, pt agrees that she will take meds tonight. She did not sleep well last night in the context of refusing meds. Denies any SI/HI at this time.      Current Medications:   Scheduled Meds:    LORazepam  1 mg Oral TID    nicotine  1 patch Transdermal Daily    OLANZapine  15 mg Oral QHS    OXcarbazepine  300 mg Oral BID      PRN Meds: aluminum-magnesium hydroxide-simethicone, hydrOXYzine pamoate, ibuprofen, OLANZapine zydis     Allergies:   Review of patient's allergies indicates:   Allergen Reactions    Codeine Anaphylaxis and Rash    Hydrocodone-acetaminophen Other (See Comments) and Swelling     Facial and mouth swelling    Diphenhydramine-pseudoephed     Diphenhydramine hcl Itching and Rash        OBJECTIVE:   Vitals   Vitals:    24 0623   BP: 120/74   Pulse: 105   Resp: 16   Temp: 98.2 °F (36.8 °C)        Mental Status Exam:  Appearance: appropriate " and fair hygiene/grooming  Sensorium: alert  Orientation: oriented to person, place, and time  Behavior/Cooperation: guarded and suspicious  Movements/Motor Activity: No tremor or involuntary movements observed. No psychomotor retardation or agitation  Speech: delayed, soft  Affect: anxious, hypervigilant  Mood: anxious  Thought Process: disorganized  Associations: loose  Thought Content: denies SI/HI/plan/intent and +paranoia as noted in HPI, ideas of reference  Thought Perceptions: +AH  Attention/Concentration: Impaired to some degree  Memory: recent and remote grossly intact  Insight: limited  Judgment: limited    Recent Results (from the past 48 hour(s))   COVID/FLU A&B PCR    Collection Time: 03/27/24 12:43 PM   Result Value Ref Range    Influenza A PCR Not Detected Not Detected    Influenza B PCR Not Detected Not Detected    SARS-CoV-2 PCR Not Detected Not Detected, Negative   Comprehensive metabolic panel    Collection Time: 03/27/24  1:16 PM   Result Value Ref Range    Sodium Level 137 136 - 145 mmol/L    Potassium Level 3.8 3.5 - 5.1 mmol/L    Chloride 103 98 - 107 mmol/L    Carbon Dioxide 25 22 - 29 mmol/L    Glucose Level 104 (H) 74 - 100 mg/dL    Blood Urea Nitrogen 5.4 (L) 7.0 - 18.7 mg/dL    Creatinine 0.70 0.55 - 1.02 mg/dL    Calcium Level Total 10.1 8.4 - 10.2 mg/dL    Protein Total 7.4 6.4 - 8.3 gm/dL    Albumin Level 4.1 3.5 - 5.0 g/dL    Globulin 3.3 2.4 - 3.5 gm/dL    Albumin/Globulin Ratio 1.2 1.1 - 2.0 ratio    Bilirubin Total 0.5 <=1.5 mg/dL    Alkaline Phosphatase 63 40 - 150 unit/L    Alanine Aminotransferase 19 0 - 55 unit/L    Aspartate Aminotransferase 18 5 - 34 unit/L    eGFR >60 mls/min/1.73/m2   TSH    Collection Time: 03/27/24  1:16 PM   Result Value Ref Range    TSH 0.649 0.350 - 4.940 uIU/mL   Ethanol    Collection Time: 03/27/24  1:16 PM   Result Value Ref Range    Ethanol Level <10.0 <=10.0 mg/dL   Acetaminophen level    Collection Time: 03/27/24  1:16 PM   Result Value Ref Range     Acetaminophen Level <17.4 (L) 17.4 - 30.0 ug/ml   CBC with Differential    Collection Time: 03/27/24  1:16 PM   Result Value Ref Range    WBC 9.54 4.50 - 11.50 x10(3)/mcL    RBC 4.62 4.20 - 5.40 x10(6)/mcL    Hgb 14.8 12.0 - 16.0 g/dL    Hct 43.8 37.0 - 47.0 %    MCV 94.8 (H) 80.0 - 94.0 fL    MCH 32.0 (H) 27.0 - 31.0 pg    MCHC 33.8 33.0 - 36.0 g/dL    RDW 12.0 11.5 - 17.0 %    Platelet 205 130 - 400 x10(3)/mcL    MPV 9.8 7.4 - 10.4 fL    Neut % 71.4 %    Lymph % 24.1 %    Mono % 4.1 %    Eos % 0.1 %    Basophil % 0.2 %    Lymph # 2.30 0.6 - 4.6 x10(3)/mcL    Neut # 6.81 2.1 - 9.2 x10(3)/mcL    Mono # 0.39 0.1 - 1.3 x10(3)/mcL    Eos # 0.01 0 - 0.9 x10(3)/mcL    Baso # 0.02 <=0.2 x10(3)/mcL    IG# 0.01 0 - 0.04 x10(3)/mcL    IG% 0.1 %   Urinalysis, Reflex to Urine Culture    Collection Time: 03/27/24  2:54 PM    Specimen: Urine   Result Value Ref Range    Color, UA Yellow Yellow, Light-Yellow, Dark Yellow, Brandi, Straw    Appearance, UA Clear Clear    Specific Gravity, UA 1.015 1.005 - 1.030    pH, UA 7.5 5.0 - 8.5    Protein, UA Negative Negative    Glucose, UA Negative Negative, Normal    Ketones, UA Negative Negative    Blood, UA Negative Negative    Bilirubin, UA Negative Negative    Urobilinogen, UA 1.0 0.2, 1.0, Normal    Nitrites, UA Negative Negative    Leukocyte Esterase, UA Negative Negative   Drug Screen, Urine    Collection Time: 03/27/24  2:54 PM   Result Value Ref Range    Amphetamines, Urine Negative Negative    Barbituates, Urine Negative Negative    Benzodiazepine, Urine Negative Negative    Cannabinoids, Urine Positive (A) Negative    Cocaine, Urine Negative Negative    Opiates, Urine Negative Negative    Phencyclidine, Urine Negative Negative    pH, Urine 7.5 3.0 - 11.0    Specific Gravity, Urine Auto 1.015 1.001 - 1.035   Urinalysis, Microscopic    Collection Time: 03/27/24  2:54 PM   Result Value Ref Range    Bacteria, UA Few (A) None Seen, Rare, Occasional /HPF    RBC, UA None Seen None  "Seen, 0-2, 3-5, 0-5 /HPF    WBC, UA 0-5 None Seen, 0-2, 3-5, 0-5 /HPF    Squamous Epithelial Cells, UA Many (A) None Seen, Rare, Occasional, Occ /HPF   Pregnancy, urine rapid    Collection Time: 03/27/24  2:54 PM   Result Value Ref Range    Beta hCG Qualitative, Urine Negative Negative   SYPHILIS ANTIBODY (WITH REFLEX RPR)    Collection Time: 03/28/24  5:28 AM   Result Value Ref Range    Syphilis Antibody Nonreactive Nonreactive, Equivocal   Glucose, Fasting    Collection Time: 03/28/24  5:28 AM   Result Value Ref Range    Glucose Fasting 84 70 - 100 mg/dL      No results found for: "PHENYTOIN", "PHENOBARB", "VALPROATE", "CBMZ"      ASSESSMENT/PLAN:   Diagnosis:  Active Hospital Problems    Diagnosis  POA    Cannabis use disorder, severe, dependence [F12.20]  Yes    Tobacco use [Z72.0]  Yes    Nonadherence to medication [Z91.148]  Not Applicable    Bipolar 1 disorder, mixed, severe [F31.63]  Yes       Plan:  - Pt refused qhs meds last night, education and reassurance provided, pt agreed to take meds starting tonight  - Cont current meds, hopefully will respond to higher dose of zyprexa for obvious psychosis  - CTM and provide support, reality testing    Expected Disposition Plan: Home      Jose Daniel Coto MD  Psychiatry - [facility]  03/29/2024 11:41 AM    "

## 2024-03-29 NOTE — NURSING
"Daily Nursing Note:      Behavior:    Patient (Felicita Victoria is a 33 y.o. female, : 1990, MRN: [id number]) demonstrating an affect that was sad, tearful, irritable, and  labile. Felicita demonstrating mood that is depressed, anxious, and fearful. Felicita had an appearance that was clean. Felicita denies suicidal ideation. Felicita denies suicide plan. Felicita denies homicidal ideation. Felicita denies hallucinations.Felicita displayed confusion and poor concentration most of the evening.  Very paranoid about peers and staff thinking she hears people calling her name.  Difficult to redirect then became apologetic and tearful.      Felicita's  height is 5' 4" (1.626 m) and weight is 41.4 kg (91 lb 4.3 oz). Her temperature is 98.3 °F (36.8 °C). Her blood pressure is 122/85 and her pulse is 93. Her respiration is 16 and oxygen saturation is 99%.     Felicita's last BM was noted on 2024.    Intervention:    Encouraged Felicita to perform self-hygiene, grooming, and changing of clothing. Monitored Felicita's behavior and program compliance. Monitored Felicita for suicidal ideation, homicidal ideation, sleep disturbance, and hallucinations. Encouraged Felicita to eat all portions of meals and assessed for meal preferences. Monitor Felicita for intake and output to ensure hydration. Will notify the Physician for any medication refusal and any change in patient condition.      Response:    Felicita verbalizes understand of unit process and procedures. Felicita refused night time meds even though they were the same doses she was taking at home but it looked different and she became paranoid and suspicious about the meds she was to take even though she took them this morning.      Plan:     Continue to monitor per MD; maintain patient safety.     "

## 2024-03-29 NOTE — GROUP NOTE
Education Group      Group Focus: Offered group on discharge planning.       Number of patients in attendance: 4    Group Start Time: 1130  Group End Time:  1215  Groups Date: 3/29/2024  Group Topic:  Behavioral Health  Group Department: [facility] - Behavioral Health Unit  Group Facilitators:  Michaela Dorsey LPN  _____________________________________________________________________    Patient Name: Felicita Victoria  MRN: [id number]  Patient Class: IP- Psych   Admission Date\Time: 3/27/2024  6:50 PM  Hospital Length of Stay: 2  Primary Care Provider: Cherrie, Primary Doctor     Referred by: Behavioral Medicine Unit Treatment Team     Target symptoms: Psychosis     Patient's response to treatment: Active Listening, Self-disclosure, and Feedback given to another patient     Progress toward goals: Progressing adequately     Interval History: Good participation. Verbalized understanding.      Diagnosis: Psychosis     Plan: Continue treatment on BMU

## 2024-03-29 NOTE — NURSING
"Daily Nursing Note:      Behavior:    Patient (Felicita Victoria is a 33 y.o. female, : 1990, MRN: [id number]) demonstrating an affect that was flat. Felicita demonstrating mood that is depressed and anxious. Felicita had an appearance that was clean. Felicita denies suicidal ideation. Felicita denies suicide plan. Felicita denies homicidal ideation. Felicita endorses auditory and visual hallucinations.    Felicita's  height is 5' 4" (1.626 m) and weight is 41.4 kg (91 lb 4.3 oz). Her temperature is 98.2 °F (36.8 °C). Her blood pressure is 120/74 and her pulse is 105. Her respiration is 16 and oxygen saturation is 98%.     Jaimes last BM was noted on: 3/28/2024.She states she is confused and frustrated and does not know how she ended up in here. (the unit)      Intervention:    Encourage Felicita to perform self-hygiene, grooming, and changing of clothing. Monitor Felicita's behavior and program compliance. Monitor Felicita for suicidal ideation, homicidal ideation, sleep disturbance, and hallucinations. Encourage Felicita to eat all portions of meals and assess for meal preferences. Monitor Felicita for intake and output to ensure hydration. Notify the Physician for any medication refusal and any change in patient condition.      Response:    Felicita verbalizes understand of unit process and procedures. Felicita did not take her meds last night, despite attempts to educate her, because she did not recognize them and there were too many. Instructed her that some may have been new and the zyprexa was in two tablets to equal the prescribed dose. She took her prescribed medications         Plan:     Continue to monitor per MD/PA/APRN orders; maintain patient safety. Q15min safety checks. Suicide precautions.  "

## 2024-03-30 PROCEDURE — S4991 NICOTINE PATCH NONLEGEND: HCPCS | Performed by: PSYCHIATRY & NEUROLOGY

## 2024-03-30 PROCEDURE — 11400000 HC PSYCH PRIVATE ROOM

## 2024-03-30 PROCEDURE — 25000003 PHARM REV CODE 250: Performed by: PSYCHIATRY & NEUROLOGY

## 2024-03-30 RX ADMIN — OXCARBAZEPINE 300 MG: 300 TABLET, FILM COATED ORAL at 08:03

## 2024-03-30 RX ADMIN — LORAZEPAM 1 MG: 1 TABLET ORAL at 08:03

## 2024-03-30 RX ADMIN — NICOTINE 1 PATCH: 21 PATCH, EXTENDED RELEASE TRANSDERMAL at 08:03

## 2024-03-30 RX ADMIN — LORAZEPAM 1 MG: 1 TABLET ORAL at 02:03

## 2024-03-30 RX ADMIN — OLANZAPINE 15 MG: 10 TABLET, FILM COATED ORAL at 08:03

## 2024-03-30 NOTE — NURSING
"Daily Nursing Note:      Behavior:    Patient (Felicita Victoria is a 33 y.o. female, : 1990, MRN: [id number]) demonstrating an affect that was flat, anxious, and irritable. Felicita demonstrating mood that is depressed and anxious. Felicita had an appearance that was clean. Felicita denies suicidal ideation. Felicita denies suicide plan. Felicita denies homicidal ideation. Felicita denies hallucinations.    Felicita's  height is 5' 4" (1.626 m) and weight is 41.4 kg (91 lb 4.3 oz). Her temperature is 98.4 °F (36.9 °C). Her blood pressure is 103/67 and her pulse is 106. Her respiration is 18 and oxygen saturation is 100%.     Jaimes last BM was noted on:3/29/2024.      Intervention:    Encourage Felicita to perform self-hygiene, grooming, and changing of clothing. Monitor Felicita's behavior and program compliance. Monitor Felicita for suicidal ideation, homicidal ideation, sleep disturbance, and hallucinations. Encourage Felicita to eat all portions of meals and assess for meal preferences. Monitor Felicita for intake and output to ensure hydration. Notify the Physician for any medication refusal and any change in patient condition.      Response:    Felicita verbalizes understand of unit process and procedures. Felicita has been compliant with medications.      Plan:     Continue to monitor per MD orders; maintain patient safety.  Q15min safety checks. Suicide precautions.  "

## 2024-03-30 NOTE — PLAN OF CARE
Problem: Cognitive Impairment (Psychotic Signs/Symptoms)  Goal: Optimal Cognitive Function (Psychotic Signs/Symptoms)  Outcome: Ongoing, Not Progressing     Problem: Mood Impairment (Psychotic Signs/Symptoms)  Goal: Improved Mood Symptoms (Psychotic Signs/Symptoms)  Outcome: Ongoing, Not Progressing     Problem: Activity and Energy Impairment (Excessive Substance Use)  Goal: Optimized Energy Level (Excessive Substance Use)  Outcome: Ongoing, Not Progressing     Problem: Suicide Risk  Goal: Absence of Self-Harm  Outcome: Ongoing, Not Progressing  Intervention: Assess Risk to Self and Maintain Safety  Flowsheets (Taken 3/30/2024 1123)  Behavior Management:   behavioral plan reviewed   boundaries reinforced   impulse control promoted  Self-Harm Prevention: environmental self-harm risks assessed  Intervention: Promote Psychosocial Wellbeing  Flowsheets (Taken 3/30/2024 1123)  Sleep/Rest Enhancement:   regular sleep/rest pattern promoted   relaxation techniques promoted

## 2024-03-30 NOTE — PROGRESS NOTES
HBarbraD. # 3    33-year-old white female who at this time was readmitted back in inpatient setting after being OPC by mom.  Patient this time was PEC'd after once again becoming bizarre and inappropriate conduct and behavior.  Patient was recent history of inpatient hospitalization at this facility with questionable compliance with olanzapine upon discharge.    Patient was seen in telemedicine platform today.  She was in [facility].  Provider was in [facility].  Patient was seen in the video/voice length telemedicine platform.      Patient was today appears to be doing little bit better.  She did not appear to be paralyzed by overall anxiety did not appear to be as psychotic during the session.      On mental status examination:  33-year-old white female whose affect at this time was blunted.  Whose speech was with fairly good articulation.  His thought content demonstrated no clear evidence of any auditory or visual hallucinations being acknowledged.  She made no active statements to harm self at this time.  She continued to be some hypervigilant with the level intensity overall paranoia was lessened this time.  She continues to be preoccupied with life events and wanting to be able to get home.  Insight and judgment deemed to be limited.      Impression:  Bipolar affective disorder most recent episode mixed manic   Cannabis use disorder    Estimated continued hospitalization greater than 72 hours     Indications:  Patient was time presents persistent ongoing psychosis with recent decompensation upon discharge need ongoing individuals to foster stabilization recovery    Recommendations:  1. Continue with trials of Zyprexa those doses now been increased to 15 mg p.o. q.h.s.  2. Continue with trials of Ativan currently beliefs 1 mg p.o. t.i.d. but we will need to begin downward titration those medications.  Suspect this recent decompensation has not just 15 mg of Zyprexa but we will need to manage a little  bit more to ensure that she was at therapeutic dosing levels have recommended least up to 20 mg before disposition back home.  3. Patient was continues to have these back-to-back hospitalizations.  Becoming increasingly more concerned that patient may have some persistent disability that needs to be addressed in the applied for far more recommendation.  The severity when she becomes psychotic impaired judgment needs to be looked at as a ongoing concern.  Although the cannabis may play a role in this as well as nonadherence intensity when she was psychotic and impaired functioning is significant.  Clearly her day in and day out functioning with any were is impaired with the level of psychosis she does experience

## 2024-03-30 NOTE — NURSING
"Daily Nursing Note:      Behavior:    Patient (Felicita Victoria is a 33 y.o. female, : 1990, MRN: [id number]) demonstrating an affect that was flat. Felicita demonstrating mood that is depressed and anxious. Felicita had an appearance that was disheveled. Felicita denies suicidal ideation. Felicita denies suicide plan. Felicita denies homicidal ideation. Felicita endorses auditory and visual hallucinations.    Felicita's  height is 5' 4" (1.626 m) and weight is 41.4 kg (91 lb 4.3 oz). Her temperature is 98.2 °F (36.8 °C). Her blood pressure is 108/64 and her pulse is 93. Her respiration is 18 and oxygen saturation is 99%.     Jaimes last BM was noted on: 3/29/24_______      Intervention:    Encourage Felicita to perform self-hygiene, grooming, and changing of clothing. Monitor Felicita's behavior and program compliance. Monitor Felicita for suicidal ideation, homicidal ideation, sleep disturbance, and hallucinations. Encourage Felicita to eat all portions of meals and assess for meal preferences. Monitor Felicita for intake and output to ensure hydration. Notify the Physician/Physician Assistant/Advance Practice Registered Nurse (MD/PA/APRN) for any medication refusal and any change in patient condition.      Response:    Felicita verbalizes understand of unit process and procedures.   Plan:     Continue to monitor per MD/PA/APRN orders; maintain patient safety.  "

## 2024-03-30 NOTE — GROUP NOTE
Group Psychotherapy       Group Focus: Spirituality and Well-Being      Number of patients in attendance: 2    Group Start Time: 0900  Group End Time:  0945  Groups Date: 3/30/2024  Group Topic:  Behavioral Health  Group Department: [facility]  Group Facilitators:  Slime Iverson RN  _____________________________________________________________________    Patient Name: Felicita Victoria  MRN: [id number]  Patient Class: IP- Psych   Admission Date\Time: 3/27/2024  6:50 PM  Hospital Length of Stay: 3  Primary Care Provider: Cherrie Primary Doctor     Referred by: Behavioral Medicine Unit Treatment Team     Target symptoms: Psychosis and Poor Coping Skills     Patient's response to treatment: Active Listening and Self-disclosure     Progress toward goals: Progressing well     Interval History: Group learn about meditation for relaxation     Diagnosis: Psychosis     Plan: Continue treatment on BMU

## 2024-03-30 NOTE — GROUP NOTE
Education Group      Group Focus: Offered group on unit safety and rules.       Number of patients in attendance: 8    Group Start Time: 1015  Group End Time:  1100  Groups Date: 3/30/2024  Group Topic:  Behavioral Health  Group Department: [facility]  Group Facilitators:  Michaela Dorsey LPN  _____________________________________________________________________    Patient Name: Felicita Victoria  MRN: [id number]  Patient Class: IP- Psych   Admission Date\Time: 3/27/2024  6:50 PM  Hospital Length of Stay: 3  Primary Care Provider: Cherrie Primary Doctor     Referred by: Behavioral Medicine Unit Treatment Team     Target symptoms: Psychosis     Patient's response to treatment: Active Listening, Self-disclosure, and Feedback given to another patient     Progress toward goals: Progressing adequately     Interval History: Verbalized understanding. Good participation.      Diagnosis: Bipolar     Plan: Continue treatment on BMU

## 2024-03-31 PROCEDURE — 11400000 HC PSYCH PRIVATE ROOM

## 2024-03-31 PROCEDURE — S4991 NICOTINE PATCH NONLEGEND: HCPCS | Performed by: PSYCHIATRY & NEUROLOGY

## 2024-03-31 PROCEDURE — 25000003 PHARM REV CODE 250: Performed by: PSYCHIATRY & NEUROLOGY

## 2024-03-31 RX ADMIN — LORAZEPAM 1 MG: 1 TABLET ORAL at 03:03

## 2024-03-31 RX ADMIN — LORAZEPAM 1 MG: 1 TABLET ORAL at 08:03

## 2024-03-31 RX ADMIN — OXCARBAZEPINE 300 MG: 300 TABLET, FILM COATED ORAL at 08:03

## 2024-03-31 RX ADMIN — NICOTINE 1 PATCH: 21 PATCH, EXTENDED RELEASE TRANSDERMAL at 08:03

## 2024-03-31 RX ADMIN — OLANZAPINE 15 MG: 10 TABLET, FILM COATED ORAL at 08:03

## 2024-03-31 NOTE — NURSING
"Daily Nursing Note:      Behavior:    Patient (Felicita Victoria is a 33 y.o. female, : 1990, MRN: [id number]) demonstrating an affect that was restricted.... Felicita demonstrating mood that is anxious. Felicita had an appearance that was clean. Felicita denies suicidal ideation. Felicita denies suicide plan. Felicita denies homicidal ideation. Felicita denies hallucinations.    Felicita's  height is 5' 4" (1.626 m) and weight is 43.4 kg (95 lb 10.9 oz). Her temperature is 98.4 °F (36.9 °C). Her blood pressure is 108/78 and her pulse is 115 (abnormal). Her respiration is 18 and oxygen saturation is 99%.     Felicita's last BM was noted on: 3/31/2024. Felicita states she is feeling better and she does appear brighter.    Intervention:    Encourage Felicita to perform self-hygiene, grooming, and changing of clothing. Monitor Felicita's behavior and program compliance. Monitor Felicita for suicidal ideation, homicidal ideation, sleep disturbance, and hallucinations. Encourage Felicita to eat all portions of meals and assess for meal preferences. Monitor Felicita for intake and output to ensure hydration. Notify the Physician for any medication refusal and any change in patient condition.      Response:    Felicita verbalizes understand of unit process and procedures. Felicita has been compliant with medications.      Plan:     Continue to monitor per MD orders; maintain patient safety. Q15mi safety checks. Suicide precautions.  "

## 2024-03-31 NOTE — NURSING
"Daily Nursing Note:      Behavior:    Patient (Felicita Victoria is a 33 y.o. female, : 1990, MRN: [id number]) demonstrating an affect that was flat and anxious. Felicita demonstrating mood that is depressed and anxious. Felicita had an appearance that was clean. Felicita denies suicidal ideation. Felicita denies suicide plan. Felicita denies homicidal ideation. Felicita denies hallucinations.    Felicita's  height is 5' 4" (1.626 m) and weight is 41.4 kg (91 lb 4.3 oz). Her temperature is 98.3 °F (36.8 °C). Her blood pressure is 110/78 and her pulse is 115 (abnormal). Her respiration is 18 and oxygen saturation is 99%.     Jaimes last BM was noted on: _3/30/24______      Intervention:    Encourage Felicita to perform self-hygiene, grooming, and changing of clothing. Monitor Felicita's behavior and program compliance. Monitor Felicita for suicidal ideation, homicidal ideation, sleep disturbance, and hallucinations. Encourage Felicita to eat all portions of meals and assess for meal preferences. Monitor Felicita for intake and output to ensure hydration. Notify the Physician/Physician Assistant/Advance Practice Registered Nurse (MD/PA/APRN) for any medication refusal and any change in patient condition.      Response:    Felicita verbalizes understand of unit process and procedures. Felicita is compliant with meds.      Plan:     Continue to monitor per MD/PA/APRN orders; maintain patient safety.  "

## 2024-03-31 NOTE — GROUP NOTE
Education Group       Group Focus: Offered group To increase interaction with peers.       Number of patients in attendance: 6    Group Start Time: 0945  Group End Time:  1030  Groups Date: 3/31/2024  Group Topic:  Behavioral Health  Group Department: [facility]  Group Facilitators:  Michaela Dorsey LPN  _____________________________________________________________________    Patient Name: Felicita Victoria  MRN: [id number]  Patient Class: IP- Psych   Admission Date\Time: 3/27/2024  6:50 PM  Hospital Length of Stay: 4  Primary Care Provider: Cherrie, Primary Doctor     Referred by: Behavioral Medicine Unit Treatment Team     Target symptoms: Mood Disorder     Patient's response to treatment: Self-disclosure, Frequent Questions, and Feedback given to another patient     Progress toward goals: Progressing well     Interval History: Good interaction with peers.     Diagnosis: bipolar     Plan: Continue treatment on BMU

## 2024-03-31 NOTE — GROUP NOTE
Group Psychotherapy       Group Focus: Relationship Dynamics      Number of patients in attendance: 6    Group Start Time: 1400  Group End Time:  1445  Groups Date: 3/31/2024  Group Topic:  Behavioral Health  Group Department: [facility]  Group Facilitators:  Sliem Iverson RN  _____________________________________________________________________    Patient Name: Felicita Victoria  MRN: [id number]  Patient Class: IP- Psych   Admission Date\Time: 3/27/2024  6:50 PM  Hospital Length of Stay: 4  Primary Care Provider: Cherrie, Primary Doctor     Referred by: Behavioral Medicine Unit Treatment Team     Target symptoms: Mood Disorder     Patient's response to treatment: Active Listening     Progress toward goals: Progressing adequately     Interval History: Attended and participated in group     Diagnosis: Bipolar 1 disorder, mixed, severe     Plan: Continue treatment on BMU

## 2024-03-31 NOTE — PLAN OF CARE
Problem: Cognitive Impairment (Psychotic Signs/Symptoms)  Goal: Optimal Cognitive Function (Psychotic Signs/Symptoms)  Outcome: Ongoing, Progressing     Problem: Mood Impairment (Psychotic Signs/Symptoms)  Goal: Improved Mood Symptoms (Psychotic Signs/Symptoms)  Outcome: Ongoing, Progressing     Problem: Activity and Energy Impairment (Excessive Substance Use)  Goal: Optimized Energy Level (Excessive Substance Use)  Outcome: Ongoing, Progressing     Problem: Suicide Risk  Goal: Absence of Self-Harm  Outcome: Met

## 2024-04-01 PROCEDURE — 25000003 PHARM REV CODE 250: Performed by: PSYCHIATRY & NEUROLOGY

## 2024-04-01 PROCEDURE — S4991 NICOTINE PATCH NONLEGEND: HCPCS | Performed by: PSYCHIATRY & NEUROLOGY

## 2024-04-01 PROCEDURE — 11400000 HC PSYCH PRIVATE ROOM

## 2024-04-01 RX ORDER — LORAZEPAM 0.5 MG/1
0.5 TABLET ORAL NIGHTLY
Status: DISCONTINUED | OUTPATIENT
Start: 2024-04-01 | End: 2024-04-03 | Stop reason: HOSPADM

## 2024-04-01 RX ADMIN — OLANZAPINE 15 MG: 10 TABLET, FILM COATED ORAL at 08:04

## 2024-04-01 RX ADMIN — HYDROXYZINE PAMOATE 50 MG: 25 CAPSULE ORAL at 06:04

## 2024-04-01 RX ADMIN — OXCARBAZEPINE 300 MG: 300 TABLET, FILM COATED ORAL at 08:04

## 2024-04-01 RX ADMIN — LORAZEPAM 1 MG: 1 TABLET ORAL at 08:04

## 2024-04-01 RX ADMIN — NICOTINE 1 PATCH: 21 PATCH, EXTENDED RELEASE TRANSDERMAL at 08:04

## 2024-04-01 RX ADMIN — LORAZEPAM 0.5 MG: 0.5 TABLET ORAL at 08:04

## 2024-04-01 NOTE — GROUP NOTE
Group Psychotherapy       Group Focus: Spirituality and Well-Being      Number of patients in attendance: 4    Group Start Time: 0900  Group End Time:  0945  Groups Date: 4/1/2024  Group Topic:  Behavioral Health  Group Department: [facility]  Group Facilitators:  Edgar De León LCSW  _____________________________________________________________________    Patient Name: Felicita Victoria  MRN: [id number]  Patient Class: IP- Psych   Admission Date\Time: 3/27/2024  6:50 PM  Hospital Length of Stay: 5  Primary Care Provider: Cherrie Primary Doctor     Referred by: Behavioral Medicine Unit Treatment Team     Target symptoms: Mood Disorder     Patient's response to treatment: Active Listening and Self-disclosure     Progress toward goals: Progressing adequately     Interval History: Group explored the value and elements of good relationships.     Diagnosis:      Plan: Continue treatment on BMU

## 2024-04-01 NOTE — GROUP NOTE
Nursing Group       Group Focus: Symptoms management and medication compliance      Number of patients in attendance: 9    Group Start Time: 1300  Group End Time:  1345  Groups Date: 4/1/2024  Group Topic:  Behavioral Health  Group Department: [facility]  Group Facilitators:  Cheri De León RN  _____________________________________________________________________    Patient Name: Felicita Victoria  MRN: [id number]  Patient Class: IP- Psych   Admission Date\Time: 3/27/2024  6:50 PM  Hospital Length of Stay: 5  Primary Care Provider: Cherrie Primary Doctor     Referred by: Behavioral Medicine Unit Treatment Team     Target symptoms: Depression, Anxiety, Psychosis, and Poor Coping Skills     Patient's response to treatment: Active Listening, Self-disclosure, and Frequent Questions     Progress toward goals: Progressing slowly     Interval History: Attended and participated with good response     Diagnosis: Bipolar     Plan: Continue treatment on BMU

## 2024-04-01 NOTE — PROGRESS NOTES
Inpatient Nutrition Assessment    Admit Date: 3/27/2024   Total duration of encounter: 5 days   Patient Age: 33 y.o.    Nutrition Recommendation/Prescription     Continue with regular diet/double portions as tolerated  Continue to provide Boost Plus TID due to poor intake (360 kcal and 14 gm protein per serving)  Weigh daily due to BMI 16.84 (severely underweight)    Communication of Recommendations:  EMR    Nutrition Assessment     Malnutrition Assessment/Nutrition-Focused Physical Exam    Malnutrition Context: acute illness or injury (03/28/24 1429)  Malnutrition Level: severe (03/28/24 1429)  Energy Intake (Malnutrition): less than 75% for greater than or equal to 1 month (03/28/24 1429)     Subcutaneous Fat (Malnutrition): severe depletion (03/28/24 1429)  Orbital Region (Subcutaneous Fat Loss): severe depletion  Upper Arm Region (Subcutaneous Fat Loss): severe depletion     Muscle Mass (Malnutrition): severe depletion (03/28/24 1429)  [place] Region (Muscle Loss): severe depletion  Clavicle Bone Region (Muscle Loss): severe depletion                 Posterior Calf Region (Muscle Loss): severe depletion           A minimum of two characteristics is recommended for diagnosis of either severe or non-severe malnutrition.    Chart Review    Reason Seen: follow-up    Malnutrition Screening Tool Results              Diagnosis:  Bipolar    Relevant Medical History:   Bipolar, anxiety, depression    Scheduled Medications:  LORazepam, 1 mg, TID  nicotine, 1 patch, Daily  OLANZapine, 15 mg, QHS  OXcarbazepine, 300 mg, BID    Continuous Infusions:   PRN Medications: aluminum-magnesium hydroxide-simethicone, hydrOXYzine pamoate, ibuprofen, OLANZapine zydis    Calorie Containing IV Medications: no significant kcals from medications at this time    Recent Labs   Lab 03/27/24  1316      K 3.8   CALCIUM 10.1   CHLORIDE 103   CO2 25   BUN 5.4*   CREATININE 0.70   EGFRNORACEVR >60   GLUCOSE 104*   BILITOT 0.5   ALKPHOS 63  "  ALT 19   AST 18   ALBUMIN 4.1   WBC 9.54   HGB 14.8   HCT 43.8       Nutrition Orders:  Diet Adult Regular Double Portions; PEC/CEC - Styrofoam      Appetite/Oral Intake: good/% of meals  Factors Affecting Nutritional Intake: decreased appetite and depression  Food/Temple/Cultural Preferences: none reported  Food Allergies: none reported  Last Bowel Movement: 24  Wound(s):  intact    Comments    (3/28) Pt with poor appetite and intake for a few weeks due to depression. Pt did consume 100% of breakfast this morning and 75% of lunch today. No N/V/C/D. Able to feed self. No difficulties chewing or swallowing. Pt does appear cachectic looking. UBW: 90# per EMR. Med/labs reviewed.     () Pt with improved appetite and intake since admit. Consuming 100% of most meals. No GI issues reported. CBW: 98# () Pt gained 7# in 5 days (desirable). Med labs reviewed.    Anthropometrics    Height: 5' 4" (162.6 cm),    Last Weight: 44.5 kg (98 lb 1.7 oz) (24), Weight Method: Standard Scale  BMI (Calculated): 16.8  BMI Classification: underweight (BMI less than 18.5)     Ideal Body Weight (IBW), Female: 120 lb     % Ideal Body Weight, Female (lb): 76.06 %                    Usual Body Weight (UBW), k.9 kg  % Usual Body Weight: 101.43     Usual Weight Provided By: EMR weight history    Wt Readings from Last 5 Encounters:   24 44.5 kg (98 lb 1.7 oz)   24 44.5 kg (98 lb)   24 41.5 kg (91 lb 9.6 oz)   24 49.9 kg (110 lb)   24 45 kg (99 lb 3.2 oz)     Weight Change(s) Since Admission: gained 7# in 5 days  Wt Readings from Last 1 Encounters:   24 0612 44.5 kg (98 lb 1.7 oz)   24 0620 43.4 kg (95 lb 10.9 oz)   24 1850 41.4 kg (91 lb 4.3 oz)   Admit Weight: 41.4 kg (91 lb 4.3 oz) (24 1850), Weight Method: Standard Scale    Estimated Needs    Weight Used For Calorie Calculations: 41.1 kg (90 lb 9.7 oz)  Energy Calorie Requirements (kcal): 1439 kcal (35 " kcal/kg)  Energy Need Method: Kcal/kg  Weight Used For Protein Calculations: 41.4 kg (91 lb 4.3 oz)  Protein Requirements: 83 gm (2 gm/kg)  Fluid Requirements (mL): 1439 ml (1 ml/kcal)    Enteral Nutrition     Patient not receiving enteral nutrition at this time.    Parenteral Nutrition     Patient not receiving parenteral nutrition support at this time.    Evaluation of Received Nutrient Intake    Calories: meeting estimated needs  Protein: meeting estimated needs    Patient Education     Not applicable.    Nutrition Diagnosis       PES: Severe acute disease or injury related malnutrition related to inability to consume sufficient nutrients as evidenced by less than 75% needs met for greater than or equal to 1 month, severe fat depletion, and severe muscle depletion. (active)    Nutrition Interventions     Intervention(s): general/healthful diet, commercial beverage, and collaboration with other providers    Goal: Meet greater than 80% of nutritional needs by follow-up. (new)  Goal: Maintain weight throughout hospitalization. (new)    Nutrition Goals & Monitoring     Dietitian will monitor: energy intake, weight, electrolyte/renal panel, glucose/endocrine profile, and gastrointestinal profile    Nutrition Risk/Follow-Up: moderate (follow-up in 3-5 days)   Please consult if re-assessment needed sooner.

## 2024-04-01 NOTE — NURSING
"Vistaril 50mg po given per Pt c/o "I feel like I'm having a panic attack."  1:1 emotional support provided.    "

## 2024-04-01 NOTE — PROGRESS NOTES
Recreation Therapy Progress Note    Date:  04/01/2024    Time:  1400    Group Title:  Leisure skills    Mood:  Patient is less depressed less anxious less withdrawn interacting more.  patient mood is brighter.    Behavior:  Patient is social skills have improved.  Patient participate in recreational therapy group at 100%.    Affect:  Patient has brighter affect less anxious less withdrawn.    Speech:  Patient talking more in group with peers and staff    Cognition:  Patient able to focused and complete assignments at 100%.  Patient cognitive skills have improved at 100%    Participation Level:  100%    Intervention:  Continue RT services continue to assist patient to use her abilities and strengths on problem-solving skills and self-worth.          Recreation Therapist   Signature:  Deborah Basilio MA CTRS

## 2024-04-01 NOTE — NURSING
"Daily Nursing Note:      Behavior:    Patient (Felicita Victoria is a 33 y.o. female, : 1990, MRN: [id number]) demonstrating an affect that was anxious. Felicita demonstrating mood that is anxious. Felicita had an appearance that was clean. Felicita denies suicidal ideation. Felicita denies suicide plan. Felicita denies homicidal ideation. Felicita denies hallucinations. Her thoughts are clearer. Affect is brighter. Focused on discharge and plans to attend outpatient during the day and work in the evenings.     Felicita's  height is 5' 4" (1.626 m) and weight is 44.5 kg (98 lb 1.7 oz). Her temperature is 98.4 °F (36.9 °C). Her blood pressure is 100/63 and her pulse is 109. Her respiration is 18 and oxygen saturation is 98%.     Jaimes last BM was noted on: 3/31/24    Intervention:    Encourage Felicita to perform self-hygiene, grooming, and changing of clothing. Monitor Felicita's behavior and program compliance. Monitor Felicita for suicidal ideation, homicidal ideation, sleep disturbance, and hallucinations. Encourage Felicita to eat all portions of meals and assess for meal preferences. Monitor Felicita for intake and output to ensure hydration. Notify the Physician for any medication refusal and any change in patient condition.      Response:    Felicita verbalizes understand of unit process and procedures. She is compliant with medications, no adverse effects observed or reported.      Plan:     Continue to monitor per MD orders; maintain patient safety.   "

## 2024-04-01 NOTE — PROGRESS NOTES
Hospital day 5.     33-year-old white female who at this time was interviewed today.  Patient does show evidence of improvement today.  She was she was improvement range affect and improve his spontaneity.  She reports she did sleep throughout the entire evening last night without any difficulty.    Patient was this time has been willing to comply with medications included olanzapine and Trileptal.  Patient was also home downward titration of Ativan.      Reinforced patient was absolutely need to maintain medications to mitigate difficulties in terms of decompensation upon discharge.    Patient was time has reinforced that she will be best served if she was participate day hospitalization program/IOP program post discharge ensure that patient does maintain stability moving forward.      On mental status examination:  33-year-old white female who at this time appears calmer he was shows a little bit more range affect.  Whose speech was with good articulation and execution.  His thought processes this time were linear and could be tracked.  His thought content demonstrated no clear evidence of any visual hallucinations.  There was no clear evidence of any auditory hallucinations.  She denies any paranoia at this time distortions perceptions.  She reports intensity or frequency of the paranoia has begun to diminish.  Insight and judgment were deemed to be limited    Impression:  Bipolar affective disorder most recent episode mixed with psychotic features  Cannabis use disorder  Nonadherence     Estimated continued hospitalization 48-72 hours     Indications:  Patient was time shows evidence of improvement in terms of overall perceptions of mood appears to be rapidly approaching a degree of stabilization where she will go to a lower level of care     Recommendations:  No changes were made with higher doses Zyprexa 15 mg p.o. q.h.s.  Continue trials of Trileptal 300 mg p.o. b.i.d..  We will decrease doses of Ativan to  0.5 mg p.o. q.h.s..  Was request that  have additional family session was worked integrate patient was into outpatient level of care with the next 48 hours.

## 2024-04-01 NOTE — NURSING
"Daily Nursing Note:      Behavior:    Patient (Felicita Victoria is a 33 y.o. female, : 1990, MRN: [id number]) demonstrating an affect that was anxious. Felicita demonstrating mood that is depressed and anxious. Felicita had an appearance that was clean. Felicita denies suicidal ideation. Felicita denies suicide plan. Felicita denies homicidal ideation. Felicita denies hallucinations.    Felicita's  height is 5' 4" (1.626 m) and weight is 43.4 kg (95 lb 10.9 oz). Her temperature is 98.1 °F (36.7 °C). Her blood pressure is 110/75 and her pulse is 116 (abnormal). Her respiration is 18 and oxygen saturation is 99%.     Jaimes last BM was noted on: 3/31/24_______      Intervention:    Encourage Felicita to perform self-hygiene, grooming, and changing of clothing. Monitor Felicita's behavior and program compliance. Monitor Felciita for suicidal ideation, homicidal ideation, sleep disturbance, and hallucinations. Encourage Felicita to eat all portions of meals and assess for meal preferences. Monitor Felicita for intake and output to ensure hydration. Notify the Physician/Physician Assistant/Advance Practice Registered Nurse (MD/PA/APRN) for any medication refusal and any change in patient condition.      Response:    Felicita verbalizes understand of unit process and procedures. Felicitais compliant with meds.      Plan:     Continue to monitor per MD/PA/APRN orders; maintain patient safety.  "

## 2024-04-01 NOTE — PLAN OF CARE
Problem: Cognitive Impairment (Psychotic Signs/Symptoms)  Goal: Optimal Cognitive Function (Psychotic Signs/Symptoms)  Outcome: Ongoing, Progressing  Intervention: Support and Promote Cognitive Ability  Flowsheets (Taken 4/1/2024 1741)  Trust Relationship/Rapport:   choices provided   emotional support provided   empathic listening provided   questions answered   questions encouraged   reassurance provided   thoughts/feelings acknowledged     Problem: Mood Impairment (Psychotic Signs/Symptoms)  Goal: Improved Mood Symptoms (Psychotic Signs/Symptoms)  Outcome: Ongoing, Progressing  Flowsheets (Taken 4/1/2024 1741)  Mutually Determined Action Steps (Improved Mood Symptoms): verbalizes increased insight  Intervention: Optimize Emotion and Mood  Flowsheets (Taken 4/1/2024 1741)  Supportive Measures:   active listening utilized   goal-setting facilitated   self-reflection promoted   verbalization of feelings encouraged  Diversional Activity: television     Problem: Activity and Energy Impairment (Excessive Substance Use)  Goal: Optimized Energy Level (Excessive Substance Use)  Outcome: Ongoing, Progressing  Flowsheets (Taken 4/1/2024 1741)  Mutually Determined Action Steps (Optimized Energy Level): participates in exercise activity  Intervention: Optimize Energy Level  Flowsheets (Taken 4/1/2024 1741)  Activity (Behavioral Health): activity encouraged  Diversional Activity: television

## 2024-04-01 NOTE — PROGRESS NOTES
Recreation Therapy Progress Note    Date:  04/1 /2024    Time:  10:00 a.m.    Group Title:  Creative expression    Mood:  Patient less anxious less withdrawn.    Behavior:  Participating well in group interacting more.  Improved social skills.  Improved participation level    Affect:  Patient has brighter affect less anxious and less depressed    Speech:  Patient talking more with staff and peers in group .    Cognition:  Patient more alert and able to focused on assignments.    Participation Level:  100%    Intervention:  Continue RT services until discharge          Recreation Therapist   Signature:  Deborah diamonds

## 2024-04-02 PROCEDURE — 25000003 PHARM REV CODE 250: Performed by: PSYCHIATRY & NEUROLOGY

## 2024-04-02 PROCEDURE — 11400000 HC PSYCH PRIVATE ROOM

## 2024-04-02 PROCEDURE — S4991 NICOTINE PATCH NONLEGEND: HCPCS | Performed by: PSYCHIATRY & NEUROLOGY

## 2024-04-02 RX ORDER — OXCARBAZEPINE 300 MG/1
300 TABLET, FILM COATED ORAL 2 TIMES DAILY
Qty: 60 TABLET | Refills: 1 | Status: SHIPPED | OUTPATIENT
Start: 2024-04-02 | End: 2024-06-01

## 2024-04-02 RX ORDER — OLANZAPINE 15 MG/1
15 TABLET ORAL NIGHTLY
Qty: 30 TABLET | Refills: 11 | Status: SHIPPED | OUTPATIENT
Start: 2024-04-02 | End: 2025-04-02

## 2024-04-02 RX ADMIN — OLANZAPINE 15 MG: 10 TABLET, FILM COATED ORAL at 08:04

## 2024-04-02 RX ADMIN — OXCARBAZEPINE 300 MG: 300 TABLET, FILM COATED ORAL at 08:04

## 2024-04-02 RX ADMIN — NICOTINE 1 PATCH: 21 PATCH, EXTENDED RELEASE TRANSDERMAL at 08:04

## 2024-04-02 RX ADMIN — LORAZEPAM 0.5 MG: 0.5 TABLET ORAL at 08:04

## 2024-04-02 NOTE — PROGRESS NOTES
Recreation Therapy Progress Note    Date:  04/02/2024    Time:  1400    Group Title:  Leisure skills    Mood:  Patient is less depressed and less anxious.  Patient is very focused on discharge on tomorrow    Behavior:  Cooperative and participate in recreational    Affect:  Patient is less anxious and less depressed.    Speech:  Patient speech is normal patient interacting verbally more in group    Cognition:  Patient able to focused on cognitive game with less prompts from staff    Participation Level:  100% in group  Intervention:  Patient has completed treatment plan goals and objectives for RT services at 100%.  Patient is pleased with the progress she has made in ready for discharge.  She states          Recreation Therapist   Signature:  Deborah Basilio ma ctrs

## 2024-04-02 NOTE — GROUP NOTE
Group Psychotherapy       Group Focus: Promoting Healthy Lifestyles      Number of patients in attendance: 3    Group Start Time: 0915  Group End Time:  0945  Groups Date: 4/2/2024  Group Topic:  Behavioral Health  Group Department: [facility]  Group Facilitators:  Edgar De León LCSW  _____________________________________________________________________    Patient Name: Felicita Victoria  MRN: [id number]  Patient Class: IP- Psych   Admission Date\Time: 3/27/2024  6:50 PM  Hospital Length of Stay: 6  Primary Care Provider: Cherrie Primary Doctor     Referred by: Behavioral Medicine Unit Treatment Team     Target symptoms: Mood Disorder     Patient's response to treatment: Active Listening and Self-disclosure     Progress toward goals: Progressing adequately     Interval History: Group talked about doing the right things for yourself.     Diagnosis:      Plan: Continue treatment on BMU

## 2024-04-02 NOTE — NURSING
"Daily Nursing Note:      Behavior:    Patient (Felicita Victoria is a 33 y.o. female, : 1990, MRN: [id number]) demonstrating an affect that was sad and anxious. Felicita demonstrating mood that is depressed and anxious. Felicita had an appearance that was clean. Felicita denies suicidal ideation. Felicita denies suicide plan. Felicita denies homicidal ideation. Felicita denies hallucinations. Felicita states she is "feeling better but not 100%."      Felicita's  height is 5' 4" (1.626 m) and weight is 44.5 kg (98 lb 1.7 oz). Her temperature is 98.3 °F (36.8 °C). Her blood pressure is 108/76 and her pulse is 116 (abnormal). Her respiration is 16 and oxygen saturation is 97%.     Felicita's last BM was noted on 2024.    Intervention:    Encouraged Felicita to perform self-hygiene, grooming, and changing of clothing. Monitored Felicita's behavior and program compliance. Monitored Felicita for suicidal ideation, homicidal ideation, sleep disturbance, and hallucinations. Encouraged Felicita to eat all portions of meals and assesse for meal preferences. Monitored Felicita for intake and output to ensure hydration. Will notify the Physician for any medication refusal and any change in patient condition.      Response:    Felicita verbalizes understandinging of unit process and procedures. Felicita reported compliance with medications and no longer refusing meds.     Plan:     Continue to monitor per MD orders; maintain patient safety with safety checks Q15 minutes and prn.   "

## 2024-04-02 NOTE — PROGRESS NOTES
RUBI # 6    33-year-old white female who at this time does not appear to be as bright spontaneous as she was with a past 24 hours.  Today she was verbalize at this time continued difficulties in terms of her mood.  She was not describing any evidence of paranoia.  She was not describing any evidence of response to internal stimuli.  For the most part she does appear to be improved but I am not convinced she has reached the level of stabilization hope for.    This is very much the same patterns she was shown her last hospitalization with a vast improvement and then regression right prior to discharge.    Patient was denies any intention to self injure.  She reports she was anxious to be able to be discharged and return home.      Patient was no complications with the overall management with medications at this time.  Patient was re-educated on overall medication trials heard     On mental status examination:  33-year-old white female whose affect is a little bit more blunted today.  Whose speech was with good articulation and execution when produced but produced slowly.  Her thought content demonstrated no clear evidence of any visual hallucinations.  There was no active statements regarding auditory hallucinations this time.  Patient was level intensity overall paranoia has diminished.  She made no statements to harm self.  No statements to harm others.  Her insight and judgment deemed to be limited orientation was intact.      Impression:  Bipolar affective disorder most recent episode mixed with psychotic features severe  Cannabis use disorder     Estimated continued stay 24-48 hours     Indications:  Patient was continues to wax and wane in terms of level intensity overall psychosis but improving suspect patient was could be integrated into a lower level of care next 24 hours she does not show further regression     Recommendations:  1. Continue Ativan those doses to 0.5 mg p.o. q.h.s. for additional 90 minutes  look towards discontinuation   2. Continue with the olanzapine 15 mg p.o. q.h.s. as patient was does appear to be showing some improvement with the dosing.  3. Continue trials of Trileptal 300 mg p.o. b.i.d..  4. We will follow up in the cycle of 24 hours and make elevation about disposition tomorrow.  Hopefully patient was will remain stable such can make the transition to outpatient level of care.

## 2024-04-02 NOTE — PLAN OF CARE
Problem: Cognitive Impairment (Psychotic Signs/Symptoms)  Goal: Optimal Cognitive Function (Psychotic Signs/Symptoms)  Outcome: Ongoing, Progressing  Intervention: Support and Promote Cognitive Ability  Flowsheets (Taken 4/2/2024 1733)  Trust Relationship/Rapport:   emotional support provided   empathic listening provided   questions answered   questions encouraged   reassurance provided   thoughts/feelings acknowledged     Problem: Mood Impairment (Psychotic Signs/Symptoms)  Goal: Improved Mood Symptoms (Psychotic Signs/Symptoms)  Outcome: Ongoing, Progressing  Flowsheets (Taken 4/2/2024 1733)  Mutually Determined Action Steps (Improved Mood Symptoms):   identifies personal treatment goal   verbalizes increased insight  Intervention: Optimize Emotion and Mood  Flowsheets (Taken 4/2/2024 1733)  Supportive Measures:   active listening utilized   goal-setting facilitated   verbalization of feelings encouraged  Diversional Activity:   art work   journaling     Problem: Activity and Energy Impairment (Excessive Substance Use)  Goal: Optimized Energy Level (Excessive Substance Use)  Outcome: Ongoing, Progressing  Intervention: Optimize Energy Level  Flowsheets (Taken 4/2/2024 1733)  Patient Performed Hygiene: shower  Diversional Activity:   art work   journaling      Pharmacist chart review completed for refill of cyclophosphamide indicates no medication or significant health changes since last pharmacist counseling. No questions for the pharmacist. Communicated with patient's spouse over phone. Patient's prescription was billed through Medicare Part B. Patient has 7 days of therapy remaining at time of refill.. Medication shipped on 8/10/22 via [employer] to [address] [facility] [id number] for delivery in 1-3 business days.     Giovana Arcos   [facility]  Phone: [phone]  [email or web address]

## 2024-04-02 NOTE — NURSING
"Daily Nursing Note:      Behavior:    Patient (Felicita Victoria is a 33 y.o. female, : 1990, MRN: [id number]) demonstrating an affect that was sad and flat. Felicita demonstrating mood that is depressed. Felicita had an appearance that was clean. Felicita denies suicidal ideation. Felicita denies suicide plan. Felicita denies homicidal ideation. Felicita denies hallucinations. Slept well throughout the night. Reports that Vistaril has been effective for episodes of increased anxiety.    Felicita's  height is 5' 4" (1.626 m) and weight is 45.8 kg (100 lb 15.5 oz). Her temperature is 98 °F (36.7 °C). Her blood pressure is 109/73 and her pulse is 116 (abnormal). Her respiration is 18 and oxygen saturation is 98%.     Jaimes last BM was noted on: 24      Intervention:    Encourage Felicita to perform self-hygiene, grooming, and changing of clothing. Monitor Felicita's behavior and program compliance. Monitor Felicita for suicidal ideation, homicidal ideation, sleep disturbance, and hallucinations. Encourage Felicita to eat all portions of meals and assess for meal preferences. Monitor Felicita for intake and output to ensure hydration. Notify the Physician for any medication refusal and any change in patient condition.      Response:    Felicita verbalizes understand of unit process and procedures. She is compliant with medications, no adverse effects observed or reported.    Plan:     Continue to monitor per MD orders; maintain patient safety. Q 15 min safety checks.  "

## 2024-04-02 NOTE — PLAN OF CARE
Problem: Cognitive Impairment (Psychotic Signs/Symptoms)  Goal: Optimal Cognitive Function (Psychotic Signs/Symptoms)  Outcome: Ongoing, Not Progressing  Intervention: Support and Promote Cognitive Ability  Flowsheets (Taken 4/1/2024 2120)  Trust Relationship/Rapport:   care explained   choices provided   emotional support provided   empathic listening provided   questions answered   questions encouraged   reassurance provided   thoughts/feelings acknowledged     Problem: Mood Impairment (Psychotic Signs/Symptoms)  Goal: Improved Mood Symptoms (Psychotic Signs/Symptoms)  Outcome: Ongoing, Not Progressing  Intervention: Optimize Emotion and Mood  Flowsheets (Taken 4/1/2024 2120)  Supportive Measures:   active listening utilized   counseling provided   positive reinforcement provided   verbalization of feelings encouraged   relaxation techniques promoted   self-care encouraged   self-reflection promoted     Problem: Activity and Energy Impairment (Excessive Substance Use)  Goal: Optimized Energy Level (Excessive Substance Use)  Outcome: Ongoing, Not Progressing  Intervention: Optimize Energy Level  Flowsheets (Taken 4/1/2024 2120)  Activity (Behavioral Health): up ad kirk

## 2024-04-02 NOTE — PROGRESS NOTES
Recreation Therapy Progress Note    Date:  04/02/2024    Time:  10:00 a.m.    Group Title:  Creative expression    Mood:  Patient less anxious and less depressed but more quiet this morning .    Behavior:  Participate in cognitive game    Affect:  Less anxious.  More calm    Speech:  Patient talking a little more in group.    Cognition:  Patient able to focused more on cognitive activity    Participation Level:  100%    Intervention:  Continue RT services          Recreation Therapist   Signature:  Deborah diamonds

## 2024-04-02 NOTE — GROUP NOTE
Nursing Group      Group Focus: Social interaction      Number of patients in attendance: 5    Group Start Time: 1500  Group End Time:  1545  Groups Date: 4/2/2024  Group Topic:  Behavioral Health  Group Department: [facility]  Group Facilitators:  Magnus Le LPN  _____________________________________________________________________    Patient Name: Felicita Victoria  MRN: [id number]  Patient Class: IP- Psych   Admission Date\Time: 3/27/2024  6:50 PM  Hospital Length of Stay: 6  Primary Care Provider: Cherrie Primary Doctor     Referred by: Behavioral Medicine Unit Treatment Team     Target symptoms: Mood Disorder     Patient's response to treatment: Active Listening     Progress toward goals: Progressing well     Interval History: Participated with good interaction     Diagnosis: Bipolar 1     Plan: Continue treatment on BMU

## 2024-04-03 VITALS
OXYGEN SATURATION: 98 % | TEMPERATURE: 98 F | SYSTOLIC BLOOD PRESSURE: 125 MMHG | HEART RATE: 115 BPM | HEIGHT: 64 IN | BODY MASS INDEX: 16.83 KG/M2 | RESPIRATION RATE: 18 BRPM | WEIGHT: 98.56 LBS | DIASTOLIC BLOOD PRESSURE: 77 MMHG

## 2024-04-03 PROCEDURE — 25000003 PHARM REV CODE 250: Performed by: PSYCHIATRY & NEUROLOGY

## 2024-04-03 RX ADMIN — OXCARBAZEPINE 300 MG: 300 TABLET, FILM COATED ORAL at 08:04

## 2024-04-03 NOTE — PROGRESS NOTES
Discharge Note:    Felicita Victoria is a 33 y.o. female, : 1990, MRN: [id number], admitted on 3/27/2024 for Angel Kerns MD with a diagnosis of Psychosis [F29].    Patient discharged on 4/3/2024 per physician orders in stable condition. Patient denied suicidal ideation, homicidal ideation, or hallucinations. Patient was discharged with valuables, personal belongings, prescriptions, discharge instructions, and an educational handout explaining the diagnosis and prescribed medications. Patient verbalized understanding of the discharge instructions and importance of follow-up visits. Patient was escorted out of the facility by [facility] and placed into a private vehicle to be transported to home.     Patient discharged on the following medications:     Medication List        START taking these medications      OLANZapine 15 MG Tab  Commonly known as: ZyPREXA  Take 1 tablet (15 mg total) by mouth every evening.            CONTINUE taking these medications      nicotine 14 mg/24 hr  Commonly known as: NICODERM CQ  Place 1 patch onto the skin once daily. APPLY 1 PATCH DAILY AND REMOVE WITH DAILY BATH AND REPLACE WITH NEW PATCH for 21 days     OXcarbazepine 300 MG Tab  Commonly known as: TRILEPTAL  Take 1 tablet (300 mg total) by mouth 2 (two) times daily.               Where to Get Your Medications        These medications were sent to [id number] - [place] - [address] [facility] [address] [facility]  [address], [place] [address]      Phone: [phone]   OLANZapine 15 MG Tab  OXcarbazepine 300 MG Tab

## 2024-04-03 NOTE — HOSPITAL COURSE
33-year-old white female who at this time was admitted to inpatient setting here at [facility] involuntarily.  Patient this time presents after apparently nonadherence to medications and failure to do so on outpatient basis.  Has a result she readily decompensated now is admitted to the inpatient services here at [place] involuntarily.      Patient was went full psychiatric assessment as well as physical assessment.  On physical evaluation patient was found to have no acute changes physical findings.  Review admission laboratories were essentially unremarkable except for evidence of cannabis in her urine.      Patient was tolerated overall individuals medically without any complications had no major medical events during her stay.      Patient was went full psychiatric assessment this time.  Patient was reassessed at this time.  Patient was presented was given evidence of psychosis or disorganized thinking impairment judgment.  Patient was recommended at this time be increased on doses of Zyprexa sources overall doses were pushed upwards to 15 mg p.o. q.h.s..  To manage overall mood and mood instability she was also maintained on trials of Trileptal at 300 mg p.o. b.i.d..  Patient was placed on downward titration of Ativan which patient was had no difficulty in terms of tolerating.    Patient was participate overall course psychotherapy groups.   did participate engaging family members or treatment process.  Patient was at a fairly uneventful course of treatment.  She was rapidly stabilized once again and after 6 days of confinement appeared to be back to baseline he was ready for disposition back into community.  Strong interpretations made to patient overall benefits of going forward to [facility] level of care post discharge she appeared willing to consider do so.    Patient was discharged to home in stable condition on 04/03/2024.  She was not suicidal, homicidal nor did she show any evidence of  active psychosis.

## 2024-04-03 NOTE — NURSING
"Daily Nursing Note:      Behavior:    Patient (Felicita Victoria is a 33 y.o. female, : 1990, MRN: [id number]) demonstrating an affect that was anxious. Felicita demonstrating mood that is anxious. Felicita had an appearance that was clean. Felicita denies suicidal ideation. Felicita denies suicide plan. Felicita denies homicidal ideation. Felicita denies hallucinations.    Felicita's  height is 5' 4" (1.626 m) and weight is 44.7 kg (98 lb 8.7 oz). Her temperature is 98.2 °F (36.8 °C). Her blood pressure is 125/77 and her pulse is 115 (abnormal). Her respiration is 18 and oxygen saturation is 98%.     Felicita's last BM was noted on: 2024. Felicita states she is anxious and excited to be going home today. She will follow up with [facility].      Intervention:    Encourage Felicita to perform self-hygiene, grooming, and changing of clothing. Monitor Felicita's behavior and program compliance. Monitor Felicita for suicidal ideation, homicidal ideation, sleep disturbance, and hallucinations. Encourage Felicita to eat all portions of meals and assess for meal preferences. Monitor Felicita for intake and output to ensure hydration. Notify the Physician/ or any medication refusal and any change in patient condition.      Response:    Felicita verbalizes understand of unit process and procedures. Felicita has been compliant with medications and reports no adverse effects.       Plan:     Continue to monitor per MD orders; maintain patient safety. Q15min safety checks.  "

## 2024-04-03 NOTE — GROUP NOTE
Group Psychotherapy       Group Focus: Promoting Healthy Lifestyles      Number of patients in attendance: 6    Group Start Time: 1915  Group End Time:  2015  Groups Date: 4/2/2024  Group Topic:  Behavioral Health  Group Department: [facility]  Group Facilitators:  Linda Gil RN  _____________________________________________________________________    Patient Name: Felicita Victoria  MRN: [id number]  Patient Class: IP- Psych   Admission Date\Time: 3/27/2024  6:50 PM  Hospital Length of Stay: 7  Primary Care Provider: Cherrie Primary Doctor     Referred by: Behavioral Medicine Unit Treatment Team     Target symptoms: Depression and Anxiety     Patient's response to treatment: Active Listening     Progress toward goals: Progressing well     Interval History: Watched movie attentively     Diagnosis: Bipolar disorder     Plan: Continue treatment on BMU

## 2024-04-03 NOTE — DISCHARGE SUMMARY
[facility]  Psychiatry  Discharge Summary      Patient Name: Felicita Victoria  MRN: [id number]  Admission Date: 3/27/2024  Hospital Length of Stay: 7 days  Discharge Date and Time: 4/3/2024  8:55 AM  Attending Physician: Cherrie att. providers found   Discharging Provider: Angel Kerns MD  Primary Care Provider: Cherrie, Primary Doctor    HPI:   33-year-old white female with a history of prior diagnosis of bipolar affective disorder versus schizoaffective disorder bipolar type who at this time now presents to this facility once again after has been discharge for only 1 week.  Patient was apparently he was brought in this time because of increasing difficulty she was experienced in terms of paranoia with the lack of trust with others.    When questioned here last time he was shown a significant level of support before he was discharged back in the community and reportedly has been adherent to medications although appears to be unlikely.  Patient was discharged on trials of Zyprexa in trials of antidepressant therapy.    Patient this time reports ongoing symptom complexes characterized by the followin.  Disorganized thinking   2. Difficulties in terms of questionable thoughts to self-harm   3. Ever present anxiety reaching levels of psychotic process.    4. Reported initial as well as terminal insomnia  5. Increased degrees of psychomotor restlessness   6. Clear evidence of misinterpretation events around her during the course of the assessment.        Patient was admitted at this time on order protective custody initiated by Mom over concerns of psychosis.  Patient was reports adamantly that she has been comply with the medications.  We will be very suspicious that patient was not been fully adherent to medications as mother has pointed this out.    Patient was this time is not a reliable historian.  She states upon discharge from this facility last she was not able to return to  work.    Patient did not identify issues of trauma at this time.  Her reliability was poor.  Patient was does not has a history of previous attempts to self injure patient was report but once again she was not a reliable historian.      Old records we will need to be reviewed.    Patient was once again does present with a cannabis in her urine toxicology screen.  She states she was not been smoking.  Is most likely that she has remained abstinence from cannabis.    Hospital Course:   33-year-old white female who at this time was admitted to inpatient setting here at [facility] involuntarily.  Patient this time presents after apparently nonadherence to medications and failure to do so on outpatient basis.  Has a result she readily decompensated now is admitted to the inpatient services here at [facility] involuntarily.      Patient was went full psychiatric assessment as well as physical assessment.  On physical evaluation patient was found to have no acute changes physical findings.  Review admission laboratories were essentially unremarkable except for evidence of cannabis in her urine.      Patient was tolerated overall individuals medically without any complications had no major medical events during her stay.      Patient was went full psychiatric assessment this time.  Patient was reassessed at this time.  Patient was presented was given evidence of psychosis or disorganized thinking impairment judgment.  Patient was recommended at this time be increased on doses of Zyprexa sources overall doses were pushed upwards to 15 mg p.o. q.h.s..  To manage overall mood and mood instability she was also maintained on trials of Trileptal at 300 mg p.o. b.i.d..  Patient was placed on downward titration of Ativan which patient was had no difficulty in terms of tolerating.    Patient was participate overall course psychotherapy groups.   did participate engaging family members or treatment process.   Patient was at a fairly uneventful course of treatment.  She was rapidly stabilized once again and after 6 days of confinement appeared to be back to baseline he was ready for disposition back into community.  Strong interpretations made to patient overall benefits of going forward to IOP level of care post discharge she appeared willing to consider do so.    Patient was discharged to home in stable condition on 04/03/2024.  She was not suicidal, homicidal nor did she show any evidence of active psychosis.     Goals of Care Treatment Preferences:  Code Status: Full Code      * No surgery found *     Consults:   Physical Exam    Mental status examination 33-year-old white female whose affect at this time shows a little bit more range today he was not quite as blunted.  His thought content demonstrated no clear evidence of any visual hallucinations.  She denied any auditory hallucinations this time.  She made no statements to harm self.  No statements to harm others.  Levels of paranoia appeared to be somewhat lessened.  Her insight and judgment this time were deemed to be adequate for discharge.  Orientation was intact.  Gait was steady.  She did not show did not show any dyskinetic movements     Significant Labs:  Unremarkable    Significant Imaging:  Unremarkable    Smoking Cessation:   Does the patient smoke? Yes  Does the patient want a prescription for Smoking Cessation? Yes  Does the patient want counseling for Smoking Cessation? No         Pending Diagnostic Studies:       None          Final Active Diagnoses:    Diagnosis Date Noted POA    PRINCIPAL PROBLEM:  Bipolar 1 disorder, mixed, severe [F31.63] 03/14/2024 Yes    Nonadherence to medication [Z91.148] 03/14/2024 Not Applicable    Tobacco use [Z72.0] 03/14/2024 Yes    Cannabis use disorder, severe, dependence [F12.20] 03/28/2024 Yes      Problems Resolved During this Admission:      No new Assessment & Plan notes have been filed under this hospital service  since the last note was generated.  Service: Psychiatry      Functional Condition: Independent ambulation, Independent communication skills, Able to access transportation , and Able to obtain/access community resources    Discharged Condition: fair    Disposition: Home or Self Care    Follow Up:   Follow-up Information       [facility] Follow up.    Why: Program will contact pateint.  Contact information:  [address] [place] [id number] [phone]                         Patient Instructions:   No discharge procedures on file.  Medications:  Reconciled Home Medications:      Medication List        START taking these medications      OLANZapine 15 MG Tab  Commonly known as: ZyPREXA  Take 1 tablet (15 mg total) by mouth every evening.            CONTINUE taking these medications      nicotine 14 mg/24 hr  Commonly known as: NICODERM CQ  Place 1 patch onto the skin once daily. APPLY 1 PATCH DAILY AND REMOVE WITH DAILY BATH AND REPLACE WITH NEW PATCH for 21 days     OXcarbazepine 300 MG Tab  Commonly known as: TRILEPTAL  Take 1 tablet (300 mg total) by mouth 2 (two) times daily.            Is patient being discharged on multiple antipsychotics? No        Total time:30 with greater than 50% of this time spent in counseling and/or coordination of care.     All elements of the transition record were discussed with the patient at discharge and patient agrees to this plan.    Angel Kerns MD  Psychiatry  [facility]

## 2024-04-03 NOTE — NURSING
"Daily Nursing Note:      Behavior:    Patient (Felicita Victoria is a 33 y.o. female, : 1990, MRN: [id number]) demonstrating an affect that was anxious. Felicita demonstrating mood that is anxious. States she is anxious and nervous regarding discharge tomorrow and anxious about getting to see her baby tomorrow.  Felicita had an appearance that was clean. Felicita denies suicidal ideation. Felicita denies suicide plan. Felicita denies homicidal ideation. Felicita denies hallucinations.    Felicita's  height is 5' 4" (1.626 m) and weight is 45.8 kg (100 lb 15.5 oz). Her temperature is 97.6 °F (36.4 °C). Her blood pressure is 111/81 and her pulse is 102. Her respiration is 16 and oxygen saturation is 99%.     Jaimes last BM was noted on: 2024.      Intervention:    Encouraged Felicita to perform self-hygiene, grooming, and changing of clothing. Monitored Felicita's behavior and program compliance. Monitored Felicita for suicidal ideation, homicidal ideation, sleep disturbance, and hallucinations. Encouraged Felicita to eat all portions of meals and assesse for meal preferences. Monitored Felicita for intake and output to ensure hydration. Will notify the Physician for any medication refusal and any change in patient condition.      Response:    Felicita verbalizes understanding of unit process and procedures. Felicita reported no medication issues.      Plan:     Continue to monitor per MD orders; maintain patient safety with safety checks Q15 minutes and prn.   "

## 2024-04-03 NOTE — PLAN OF CARE
Problem: Cognitive Impairment (Psychotic Signs/Symptoms)  Goal: Optimal Cognitive Function (Psychotic Signs/Symptoms)  Outcome: Ongoing, Progressing  Intervention: Support and Promote Cognitive Ability  Flowsheets (Taken 4/2/2024 1921)  Trust Relationship/Rapport:   care explained   choices provided   emotional support provided   empathic listening provided   questions answered   questions encouraged   reassurance provided   thoughts/feelings acknowledged     Problem: Mood Impairment (Psychotic Signs/Symptoms)  Goal: Improved Mood Symptoms (Psychotic Signs/Symptoms)  Outcome: Ongoing, Progressing  Intervention: Optimize Emotion and Mood  Flowsheets (Taken 4/2/2024 1921)  Supportive Measures:   active listening utilized   goal-setting facilitated   positive reinforcement provided   relaxation techniques promoted   self-responsibility promoted   counseling provided   decision-making supported   problem-solving facilitated   self-care encouraged   self-reflection promoted   verbalization of feelings encouraged     Problem: Activity and Energy Impairment (Excessive Substance Use)  Goal: Optimized Energy Level (Excessive Substance Use)  Outcome: Ongoing, Progressing  Intervention: Optimize Energy Level  Flowsheets (Taken 4/2/2024 1921)  Activity (Behavioral Health): up ad kirk